# Patient Record
Sex: MALE | Race: WHITE | NOT HISPANIC OR LATINO | Employment: UNEMPLOYED | URBAN - METROPOLITAN AREA
[De-identification: names, ages, dates, MRNs, and addresses within clinical notes are randomized per-mention and may not be internally consistent; named-entity substitution may affect disease eponyms.]

---

## 2021-01-20 ENCOUNTER — TRANSFERRED RECORDS (OUTPATIENT)
Dept: HEALTH INFORMATION MANAGEMENT | Facility: CLINIC | Age: 26
End: 2021-01-20

## 2023-08-30 ENCOUNTER — OFFICE VISIT (OUTPATIENT)
Dept: FAMILY MEDICINE | Facility: CLINIC | Age: 28
End: 2023-08-30
Payer: MEDICAID

## 2023-08-30 VITALS
WEIGHT: 168 LBS | TEMPERATURE: 98.1 F | SYSTOLIC BLOOD PRESSURE: 138 MMHG | DIASTOLIC BLOOD PRESSURE: 84 MMHG | RESPIRATION RATE: 18 BRPM | HEART RATE: 94 BPM | HEIGHT: 67 IN | OXYGEN SATURATION: 94 % | BODY MASS INDEX: 26.37 KG/M2

## 2023-08-30 DIAGNOSIS — E10.9 TYPE 1 DIABETES MELLITUS WITHOUT COMPLICATION (H): Primary | ICD-10-CM

## 2023-08-30 DIAGNOSIS — Z00.00 ANNUAL PHYSICAL EXAM: ICD-10-CM

## 2023-08-30 PROBLEM — F90.9 ATTENTION DEFICIT HYPERACTIVITY DISORDER (ADHD): Status: ACTIVE | Noted: 2018-01-24

## 2023-08-30 PROBLEM — N17.9 ACUTE KIDNEY INJURY (H): Status: ACTIVE | Noted: 2020-01-01

## 2023-08-30 PROCEDURE — 99207 PR FOOT EXAM NO CHARGE: CPT | Mod: 25 | Performed by: PHYSICIAN ASSISTANT

## 2023-08-30 PROCEDURE — 99385 PREV VISIT NEW AGE 18-39: CPT | Performed by: PHYSICIAN ASSISTANT

## 2023-08-30 RX ORDER — ALBUTEROL SULFATE 90 UG/1
2 AEROSOL, METERED RESPIRATORY (INHALATION) EVERY 6 HOURS PRN
Qty: 18 G | Refills: 0 | Status: SHIPPED | OUTPATIENT
Start: 2023-08-30 | End: 2023-08-30

## 2023-08-30 RX ORDER — SERTRALINE HYDROCHLORIDE 100 MG/1
100 TABLET, FILM COATED ORAL DAILY
COMMUNITY
End: 2023-08-30

## 2023-08-30 RX ORDER — LISDEXAMFETAMINE DIMESYLATE 10 MG/1
10 CAPSULE ORAL PRN
COMMUNITY
Start: 2023-08-03

## 2023-08-30 RX ORDER — PEN NEEDLE, DIABETIC 32GX 5/32"
NEEDLE, DISPOSABLE MISCELLANEOUS
COMMUNITY
Start: 2023-04-23 | End: 2023-10-10

## 2023-08-30 RX ORDER — PROCHLORPERAZINE 25 MG/1
SUPPOSITORY RECTAL
Qty: 3 EACH | Refills: 5 | Status: SHIPPED | OUTPATIENT
Start: 2023-08-30 | End: 2023-11-22

## 2023-08-30 RX ORDER — INSULIN ASPART 100 [IU]/ML
INJECTION, SOLUTION INTRAVENOUS; SUBCUTANEOUS
COMMUNITY
End: 2023-11-22

## 2023-08-30 RX ORDER — INSULIN DEGLUDEC 200 U/ML
40 INJECTION, SOLUTION SUBCUTANEOUS EVERY MORNING
COMMUNITY
End: 2024-01-30

## 2023-08-30 RX ORDER — INSULIN PMP CART,AUT,G6/7,CNTR
EACH SUBCUTANEOUS
COMMUNITY
Start: 2023-05-02 | End: 2023-08-30

## 2023-08-30 RX ORDER — PROCHLORPERAZINE 25 MG/1
SUPPOSITORY RECTAL
Qty: 3 EACH | Refills: 5 | Status: SHIPPED | OUTPATIENT
Start: 2023-08-30 | End: 2023-08-30

## 2023-08-30 RX ORDER — ERGOCALCIFEROL 1.25 MG/1
50000 CAPSULE, LIQUID FILLED ORAL WEEKLY
COMMUNITY
Start: 2023-04-28 | End: 2023-08-30

## 2023-08-30 ASSESSMENT — ENCOUNTER SYMPTOMS
PARESTHESIAS: 0
FREQUENCY: 0
HEADACHES: 0
SORE THROAT: 0
FEVER: 0
ARTHRALGIAS: 0
NERVOUS/ANXIOUS: 0
HEMATOCHEZIA: 0
SHORTNESS OF BREATH: 0
DIZZINESS: 0
ABDOMINAL PAIN: 0
DIARRHEA: 0
CHILLS: 0
HEARTBURN: 0
NAUSEA: 0
JOINT SWELLING: 0
PALPITATIONS: 0
CONSTIPATION: 0
DYSURIA: 0
WEAKNESS: 0
COUGH: 0
HEMATURIA: 0
MYALGIAS: 0
EYE PAIN: 0

## 2023-08-30 NOTE — PROGRESS NOTES
SUBJECTIVE:   CC: Aashish is an 28 year old who presents for preventative health visit.       28-year-old here to establish care  He recently relocated from [place]  He is a type I diabetic and has been since age 10  He is on a Dexcom continuous glucose monitor  He states his last 3 A1c's were in the sixes to low sevens  He has an appointment with endocrinology but not until December  He has no complaints or concerns does need some medications he states his labs are up-to-date    Healthy Habits:     Getting at least 3 servings of Calcium per day:  Yes    Bi-annual eye exam:  Yes    Dental care twice a year:  Yes    Sleep apnea or symptoms of sleep apnea:  None    Diet:  Regular (no restrictions)    Frequency of exercise:  2-3 days/week    Duration of exercise:  30-45 minutes    Taking medications regularly:  Yes    Medication side effects:  None    Additional concerns today:  No      Today's PHQ-2 Score:       8/30/2023     2:42 PM   PHQ-2 ( 1999 [employer])   Q1: Little interest or pleasure in doing things 0   Q2: Feeling down, depressed or hopeless 0   PHQ-2 Score 0   Q1: Little interest or pleasure in doing things Not at all   Q2: Feeling down, depressed or hopeless Not at all   PHQ-2 Score 0                   Have you ever done Advance Care Planning? (For example, a Health Directive, POLST, or a discussion with a medical provider or your loved ones about your wishes): No, advance care planning information given to patient to review.  Patient declined advance care planning discussion at this time.    Social History     Tobacco Use    Smoking status: Former     Types: Cigarettes    Smokeless tobacco: Never   Substance Use Topics    Alcohol use: Not on file             8/30/2023     2:42 PM   Alcohol Use   Prescreen: >3 drinks/day or >7 drinks/week? No       Last PSA: No results found for: PSA    Reviewed orders with patient. Reviewed health maintenance and updated orders accordingly - Yes  Lab work is in  "process    Reviewed and updated as needed this visit by clinical staff   Tobacco  Allergies  Meds              Reviewed and updated as needed this visit by Provider                     Review of Systems   Constitutional:  Negative for chills and fever.   HENT:  Negative for congestion, ear pain, hearing loss and sore throat.    Eyes:  Negative for pain and visual disturbance.   Respiratory:  Negative for cough and shortness of breath.    Cardiovascular:  Negative for chest pain and palpitations.   Gastrointestinal:  Negative for abdominal pain, constipation, diarrhea and nausea.   Genitourinary:  Negative for dysuria, frequency, genital sores, hematuria and urgency.   Musculoskeletal:  Negative for arthralgias, joint swelling and myalgias.   Skin:  Negative for rash.   Neurological:  Negative for dizziness, weakness and headaches.   Psychiatric/Behavioral:  The patient is not nervous/anxious.          OBJECTIVE:   /84   Pulse 94   Temp 98.1  F (36.7  C)   Resp 18   Ht 1.708 m (5' 7.25\")   Wt 76.2 kg (168 lb)   SpO2 94%   BMI 26.12 kg/m      Physical Exam  Vitals reviewed.   Constitutional:       General: Aashish Vanegas is not in acute distress.     Appearance: Normal appearance. Aashish Vanegas is well-developed. Aashish Vanegas is not ill-appearing.   HENT:      Head: Normocephalic and atraumatic.      Right Ear: Tympanic membrane and external ear normal.      Left Ear: Tympanic membrane and external ear normal.      Nose: Nose normal. No rhinorrhea.      Mouth/Throat:      Mouth: Mucous membranes are moist.      Pharynx: No oropharyngeal exudate.   Eyes:      Extraocular Movements: Extraocular movements intact.      Conjunctiva/sclera: Conjunctivae normal.   Neck:      Thyroid: No thyromegaly.      Trachea: No tracheal deviation.   Cardiovascular:      Rate and Rhythm: Normal rate and regular rhythm.      Pulses: Normal pulses.      Heart sounds: Normal heart sounds, S1 normal and " S2 normal. No murmur heard.     No S3 or S4 sounds.   Pulmonary:      Effort: Pulmonary effort is normal. No respiratory distress.      Breath sounds: Normal breath sounds. No wheezing or rales.   Chest:   Breasts:     Right: No inverted nipple, mass, nipple discharge or skin change.      Left: No inverted nipple, mass, nipple discharge or skin change.   Genitourinary:     Labia:         Right: No rash.         Left: No rash.       Vagina: Normal.      Cervix: Normal.   Musculoskeletal:         General: Normal range of motion.      Cervical back: Normal range of motion and neck supple.   Lymphadenopathy:      Cervical: No cervical adenopathy.      Upper Body:      Right upper body: No supraclavicular or axillary adenopathy.      Left upper body: No supraclavicular or axillary adenopathy.   Skin:     General: Skin is warm and dry.      Findings: No rash.   Neurological:      General: No focal deficit present.      Mental Status: Aashish Vanegas is alert.      Motor: No abnormal muscle tone.      Deep Tendon Reflexes: Reflexes are normal and symmetric.      Comments: Monofilament 5/5 bilateral feet   Psychiatric:         Behavior: Behavior normal.               ASSESSMENT/PLAN:   (E10.9) Type 1 diabetes mellitus without complication (H)  (primary encounter diagnosis)  Comment: Endocrinology as scheduled he may be seen here in 6 months and prior as neededPlan: Continuous Glucose Monitor Sup KIT, Continuous         Blood Gluc Sensor (DEXCOM G6 SENSOR) MISC, FOOT        EXAM            (Z00.00) Annual physical exam  Comment: Return in 1 year  Plan:           COUNSELING:   Reviewed preventive health counseling, as reflected in patient instructions       Regular exercise       Healthy diet/nutrition        Aashish Vanegas reports that Aashish Vanegas has quit smoking. Aashish Vanegas's smoking use included cigarettes. Aashish Vanegas has never used smokeless tobacco.            LILY Bess  [facility]

## 2023-09-02 ENCOUNTER — MYC MEDICAL ADVICE (OUTPATIENT)
Dept: FAMILY MEDICINE | Facility: CLINIC | Age: 28
End: 2023-09-02
Payer: COMMERCIAL

## 2023-09-02 DIAGNOSIS — E10.9 TYPE 1 DIABETES MELLITUS WITHOUT COMPLICATION (H): Primary | ICD-10-CM

## 2023-09-05 RX ORDER — PROCHLORPERAZINE 25 MG/1
SUPPOSITORY RECTAL
Qty: 3 EACH | Refills: 5 | OUTPATIENT
Start: 2023-09-05

## 2023-09-05 NOTE — TELEPHONE ENCOUNTER
Last Written Prescription Date:  8/30/23  Last Fill Quantity: 3,  # refills: 5   Last office visit provider:  8/30/23     MARCELLE BAILEY RN 09/05/23 8:47 AM

## 2023-09-11 NOTE — TELEPHONE ENCOUNTER
"Routing to provider to approve refills if appropriate as medications are currently \"patient reported\".          "

## 2023-09-21 ENCOUNTER — TELEPHONE (OUTPATIENT)
Dept: ENDOCRINOLOGY | Facility: CLINIC | Age: 28
End: 2023-09-21
Payer: COMMERCIAL

## 2023-09-21 NOTE — TELEPHONE ENCOUNTER
RECORDS RECEIVED FROM: internal/epic   DATE RECEIVED: records requested from [facility]  9/21/23   NOTES (FOR ALL VISITS) STATUS DETAILS   OFFICE NOTES from referring provider Internal Self    OFFICE NOTES from other specialist Internal 8/30/23 Nixon Barrios PA - type 1 diabetes without complication   MEDICATION LIST Internal    IMAGING      LABS     DIABETES: HBGA1C, CREATININE, FASTING LIPIDS, MICROALBUMIN URINE, POTASSIUM, TSH, T4    THYROID: TSH, T4, CBC, THYRODLONULIN, TOTAL T3, FREE T4, CALCITONIN, CEA [facility]    CBC: 11/28/22  HBGA1C:7/27/23        Records Requested     September 21, 2023 10:27 AM   [id number]   Facility  [facility] Phone: [phone] Fax: [phone]   Outcome Requested

## 2023-09-25 NOTE — TELEPHONE ENCOUNTER
Per caller brooklyn irvin there were no clinical notes attached to this fax sent they need those added  from the  last 12 months faxed to them asap same fax number.

## 2023-09-27 ENCOUNTER — TELEPHONE (OUTPATIENT)
Dept: ENDOCRINOLOGY | Facility: CLINIC | Age: 28
End: 2023-09-27
Payer: COMMERCIAL

## 2023-09-28 NOTE — TELEPHONE ENCOUNTER
Juanita has been informed that patient has not yet been established with a provider and his scheduled appt as a new patient is not until 12/2023.  Epic appt notes also state that patient needs to get established with a PCP.

## 2023-09-29 ENCOUNTER — TELEPHONE (OUTPATIENT)
Dept: ENDOCRINOLOGY | Facility: CLINIC | Age: 28
End: 2023-09-29
Payer: COMMERCIAL

## 2023-09-29 NOTE — TELEPHONE ENCOUNTER
Patient call:     Appointment type: new diabetes   Provider: any PA any location   Return date:sooner than Dec. 2023   Speciality phone number: [phone]  Additional appointment(s) needed:   Additional notes: LVM and sent myc x1 to schedule sooner appt with PA any location   OK TO USE [facility]     Please note that the above appointment(s) will require manual scheduling as they are marked as [facility] and will not appear using auto search. Do not schedule the patient if another patient has already been scheduled in the requested appointment slot.           Carrie Espinoza on 9/29/2023 at 1:21 PM

## 2023-10-09 ENCOUNTER — MYC MEDICAL ADVICE (OUTPATIENT)
Dept: FAMILY MEDICINE | Facility: CLINIC | Age: 28
End: 2023-10-09
Payer: COMMERCIAL

## 2023-10-09 DIAGNOSIS — E10.9 TYPE 1 DIABETES MELLITUS WITHOUT COMPLICATION (H): Primary | ICD-10-CM

## 2023-10-10 RX ORDER — PEN NEEDLE, DIABETIC 32GX 5/32"
NEEDLE, DISPOSABLE MISCELLANEOUS 3 TIMES DAILY
Qty: 100 EACH | Refills: 1 | Status: SHIPPED | OUTPATIENT
Start: 2023-10-10 | End: 2023-11-22

## 2023-10-22 ENCOUNTER — HEALTH MAINTENANCE LETTER (OUTPATIENT)
Age: 28
End: 2023-10-22

## 2023-11-15 NOTE — CONFIDENTIAL NOTE
RECORDS RECEIVED FROM: internal/epic   DATE RECEIVED: records requested from [facility]  9/21/23   NOTES (FOR ALL VISITS) STATUS DETAILS   OFFICE NOTES from referring provider Internal Self    OFFICE NOTES from other specialist Internal 8/30/23 [facility] Nixon Hua PA - type 1 diabetes without complication    [facility] - 9.25.23    MEDICATION LIST Internal     IMAGING        LABS       DIABETES: HBGA1C, CREATININE, FASTING LIPIDS, MICROALBUMIN URINE, POTASSIUM, TSH, T4     THYROID: TSH, T4, CBC, THYRODLONULIN, TOTAL T3, FREE T4, CALCITONIN, CEA [facility]     CBC: 11/28/22  HBGA1C:7/27/23  Cmp- 7/27/23  Creatinine- 11.28.22  TSH- 11.21.22

## 2023-11-20 NOTE — PROGRESS NOTES
Virtual Visit Details    Type of service:  Video Visit   Video Start Time:   Video End Time:    Originating Location (pt. Location):     Distant Location (provider location):    Platform used for Video Visit:         Outcome for 11/20/23 10:02 AM: Telecom Italiahart message sent  Gayle Trent CMA  Outcome for 11/21/23 1:16 PM: Left Voicemail   Blaire Gale MA  Outcome for 11/22/23 9:02 AM: Left Voicemail   Patricia Quiñones LPN   Outcome for 11/22/23 9:12 AM: Patient needs to connect dexcom account and upload device.   Patricia Quiñones LPN

## 2023-11-21 ENCOUNTER — TELEPHONE (OUTPATIENT)
Dept: ENDOCRINOLOGY | Facility: CLINIC | Age: 28
End: 2023-11-21
Payer: COMMERCIAL

## 2023-11-21 NOTE — TELEPHONE ENCOUNTER
Called patient and left voicemail. Patient has appointment on  11/22/23 . Need to collect 14 days of blood sugar readings if patient is using meter, or if patient is using CGM, need to get patients device uploaded to retrieve report for provider to review.  Blaire Gale MA

## 2023-11-22 ENCOUNTER — VIRTUAL VISIT (OUTPATIENT)
Dept: ENDOCRINOLOGY | Facility: CLINIC | Age: 28
End: 2023-11-22
Payer: COMMERCIAL

## 2023-11-22 VITALS — HEIGHT: 68 IN | WEIGHT: 160 LBS | BODY MASS INDEX: 24.25 KG/M2

## 2023-11-22 DIAGNOSIS — E10.9 TYPE 1 DIABETES MELLITUS WITHOUT COMPLICATION (H): ICD-10-CM

## 2023-11-22 DIAGNOSIS — E10.65 TYPE 1 DIABETES MELLITUS WITH HYPERGLYCEMIA (H): Primary | ICD-10-CM

## 2023-11-22 PROCEDURE — 99204 OFFICE O/P NEW MOD 45 MIN: CPT | Mod: GT | Performed by: PHYSICIAN ASSISTANT

## 2023-11-22 RX ORDER — PROCHLORPERAZINE 25 MG/1
SUPPOSITORY RECTAL
Qty: 1 EACH | Refills: 11 | Status: SHIPPED | OUTPATIENT
Start: 2023-11-22 | End: 2023-12-07

## 2023-11-22 RX ORDER — PEN NEEDLE, DIABETIC 32GX 5/32"
NEEDLE, DISPOSABLE MISCELLANEOUS 3 TIMES DAILY
Qty: 250 EACH | Refills: 3 | Status: SHIPPED | OUTPATIENT
Start: 2023-11-22 | End: 2024-01-07

## 2023-11-22 RX ORDER — PROCHLORPERAZINE 25 MG/1
SUPPOSITORY RECTAL
Qty: 6 EACH | Refills: 11 | Status: SHIPPED | OUTPATIENT
Start: 2023-11-22 | End: 2023-12-07

## 2023-11-22 ASSESSMENT — PAIN SCALES - GENERAL: PAINLEVEL: NO PAIN (0)

## 2023-11-22 NOTE — LETTER
11/22/2023       RE: Aashish Vanegas  [address] [facility] [address]     Dear Colleague,    Thank you for referring your patient, Aashish Vanegas, to the [facility] [place] [facility]. Please see a copy of my visit note below.    Virtual Visit Details    Type of service:  Video Visit   Video Start Time:   Video End Time:    Originating Location (pt. Location):     Distant Location (provider location):    Platform used for Video Visit:         Outcome for 11/20/23 10:02 AM: Magin message sent  Gayle Trent CMA  Outcome for 11/21/23 1:16 PM: Left Voicemail   Blaire Gale MA  Outcome for 11/22/23 9:02 AM: Left Voicemail   Patricia Quiñones LPN   Outcome for 11/22/23 9:12 AM: Patient needs to connect dexcom account and upload device.   Patricia Quiñones LPN         Time of start: 10:31 am   Time of end: 11:01 am   Total duration of video visit:30 minutes.  Providers location: offsite.  Patients location: [place].    HPI  Aashish Vanegas is a 28 year old male with type 1 diabetes mellitus being seen today as a new patient for diabetes evaluation and management.  Pt moved from [place] to [place] in early Aug 2023.  He is currently living in [place], [place] and is at a coffee shop in [place], [place] now during our video visit.  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  He was dx having type 1 DM at age 10.  His diabetes is complicated by diabetic retinopathy. No hx of nephropathy or neuropathy.  Pt has hx of ADHD, depression, admissions for DKA- last episode was Fall of 2022 stating he was no longer on his parents health insurance and was without insulin for a few days which caused DKA. He also has hx of low Vit B12 and low Vit D. He states he has never been tested for celiac.  For his diabetes, he is currently using Tresiba 200 unit/mL pen and takes 40 units subcutaneous each am. He also takes  Novolog 1 unit/4 gms CHO with meals with correction 1 unit/40 for BG > 140.  Most recent A1C was 7.3 % on 7/27/2023.  Previous A1C was 6.7 % in 3/2023.  Pt's A1C was 6.7 % in Nov 2022, 7.6 % in 9/2021, 8.1 % in 3/2021 and 9.5 % in Oct 2020.  Aashish's C-peptide was < 0.10 ng/mL in 3/2021 and MERRICK Ab + 3/2021.  Pt was unable to upload his DexcomG6 sensor data before his appointment today. He plans to get his DexcomG6 sensor data to me later today.  On ROS today, no complaints.  Weight stable per patient.  He states he eats healthy. Maybe 1 regular soda 3 times per month.  He is active.  Pt denies frequent headaches, hx of CVA, blurred vision, n/v, hx of thyroid disease, SOB at rest, cough or fever.  No chest pain or cardiac hx.  Denies abd pain, diarrhea, dysuria or hematuria.  Aashish denies neuropathy sx or foot ulcers.  No hx of HTN.    Diabetes Care  Retinopathy: yes- seen in Aug 2022 with PDR left eye and NPDR right eye.  Nephropathy:no. Urine microalbuminuria negative in Nov 2022.  Neuropathy: no.  Foot Exam: no exam today.  Taking aspirin: no.  Lipids:  in Dec 2022.  Insulin: Basal and meal time insulin with correction. Pt is interested in the OmniPod5 insulin pump- referred to CDE.  Testing: DexcomG6 sensor.    ROS  See under HPI.    Allergies  Allergies   Allergen Reactions    Penicillins Rash    Pollen Extract Other (See Comments)     Rhinorrhea, itchy eyes, sneezing.       Medications  Current Outpatient Medications   Medication Sig Dispense Refill    BD PEN NEEDLE ARCHIE 2ND GEN 32G X 4 MM miscellaneous Inject Subcutaneous 3 times daily Use 3 pen needles daily. 250 each 3    Continuous Blood Gluc Sensor (DEXCOM G6 SENSOR) MISC Change every 10 days. 6 each 11    Continuous Blood Gluc Transmit (DEXCOM G6 TRANSMITTER) MISC Change every 3 months. 1 each 11    Continuous Glucose Monitor Sup KIT 1 Act continuous 1 kit 3    Glucagon (BAQSIMI) 3 MG/DOSE nasal powder Spray 1 spray (3 mg) in nostril as needed in  the event of unconscious hypoglycemia or hypoglycemic seizure. May repeat dose if no response after 15 minutes. 1 each 1    insulin aspart (NOVOLOG PEN) 100 UNIT/ML pen Inject 1 unit/4 gms CHO with meals,plus correction (1unit/40 for BG > 140 ). Pt uses 25 units daily. 15 mL 3    lisdexamfetamine (VYVANSE) 10 MG capsule Take 10 mg by mouth every morning      TRESIBA FLEXTOUCH 200 UNIT/ML pen Inject 40 Units Subcutaneous every morning         Family History    Denies family hx of diabetes.      Social History    Smoke: none at this time.   ETOH: social. One to two drinks per week per patient.  Marital status: single.  Children: none.  Occupation: retail work.    Past Medical History  Type 1 DM dx age 10  Diabetic retinopathy  Low vit D  Low Vit B12  ADHD  Depression  Seasonal allergies.    No surgeries.      Physical Exam    No exam today.    RESULTS    A1C 7.3 % on 7/27/2023    TSH normal in July 2023    Creat 0.8 / GFR > 60 mL/min in 7/2023    C-peptide < 0.10 ng/mL and FARHANA Ab + in 3/2021.      ASSESSMENT/PLAN:      TYPE 1 DIABETES MELLITUS: 28 year old male with type 1 DM dx at age 10 complicated by diabetic retinopathy. No hx of nephropathy or neuropathy. Past hx of admissions for DKA. Last DKA episode was in Fall 2022 due to lack of insulin/medical insurance.  I have no DexcomG6 sensor data to review at his time. He states he will upload his sensor data for me to review later today. For now will continue current insulin doses. He is interested in the OmniPod5 insulin pump. Referred to CDE/RD to discuss insulin pumps. His urine microalbuminuria has been negative with normal creat/GFR and no sx of diabetic neuropathy or foot ulcers.  No vitals today.  RETINOPATHY: Reminded him to see Oph annually. Denies blurred vision.  MENTAL HEALTH: Stable at this time per patient.  FOLLOW UP: With me in 3 months. Referred to CDE/RD to discuss use of insulin pump. Insulins, pen needles, DexcomG6 sensors and transmitters refilled  today. Baqsimi RX sent to pharmacy today. A1C  and annual fasting diabetes labs ordered today including a Vit D , Vit B12 level and celiac lab.    Time spent reviewing chart and labs today = 10 minutes.  Time for video visit today = 30 minutes.  Time for documentation today = 15 minutes.    Total time for visit today = 55 minutes.    Sommer Dsouza PA-C

## 2023-11-22 NOTE — TELEPHONE ENCOUNTER
Called patient and left voicemail. Patient has an appointment on 11/22/2023. Need patient to upload their Dexcom device to site for provider to review prior to their appointment.    Patricia Quiñones LPN 11/22/23 9:02 AM

## 2023-11-22 NOTE — PROGRESS NOTES
Time of start: 10:31 am   Time of end: 11:01 am   Total duration of video visit:30 minutes.  Providers location: offsite.  Patients location: [place].    [facility] [place]  Aashish Vanegas is a 28 year old male with type 1 diabetes mellitus being seen today as a new patient for diabetes evaluation and management.  Pt moved from [place] to [place] in early Aug 2023.  He is currently living in [place], [place] and is at a coffee shop in [place], [place] now during our video visit.  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  He was dx having type 1 DM at age 10.  His diabetes is complicated by diabetic retinopathy. No hx of nephropathy or neuropathy.  Pt has hx of ADHD, depression, admissions for DKA- last episode was Fall of 2022 stating he was no longer on his parents health insurance and was without insulin for a few days which caused DKA. He also has hx of low Vit B12 and low Vit D. He states he has never been tested for celiac.  For his diabetes, he is currently using Tresiba 200 unit/mL pen and takes 40 units subcutaneous each am. He also takes Novolog 1 unit/4 gms CHO with meals with correction 1 unit/40 for BG > 140.  Most recent A1C was 7.3 % on 7/27/2023.  Previous A1C was 6.7 % in 3/2023.  Pt's A1C was 6.7 % in Nov 2022, 7.6 % in 9/2021, 8.1 % in 3/2021 and 9.5 % in Oct 2020.  Aashish's C-peptide was < 0.10 ng/mL in 3/2021 and MERRICK Ab + 3/2021.  Pt was unable to upload his DexcomG6 sensor data before his appointment today. He plans to get his DexcomG6 sensor data to me later today.  On ROS today, no complaints.  Weight stable per patient.  He states he eats healthy. Maybe 1 regular soda 3 times per month.  He is active.  Pt denies frequent headaches, hx of CVA, blurred vision, n/v, hx of thyroid disease, SOB at rest, cough or fever.  No chest pain or cardiac hx.  Denies abd pain, diarrhea, dysuria or hematuria.  Aashish denies neuropathy sx or foot ulcers.  No hx of HTN.    Diabetes Care  Retinopathy:  yes- seen in Aug 2022 with PDR left eye and NPDR right eye.  Nephropathy:no. Urine microalbuminuria negative in Nov 2022.  Neuropathy: no.  Foot Exam: no exam today.  Taking aspirin: no.  Lipids:  in Dec 2022.  Insulin: Basal and meal time insulin with correction. Pt is interested in the OmniPod5 insulin pump- referred to CDE.  Testing: DexcomG6 sensor.    ROS  See under HPI.    Allergies  Allergies   Allergen Reactions    Penicillins Rash    Pollen Extract Other (See Comments)     Rhinorrhea, itchy eyes, sneezing.       Medications  Current Outpatient Medications   Medication Sig Dispense Refill    BD PEN NEEDLE ARCHIE 2ND GEN 32G X 4 MM miscellaneous Inject Subcutaneous 3 times daily Use 3 pen needles daily. 250 each 3    Continuous Blood Gluc Sensor (DEXCOM G6 SENSOR) MISC Change every 10 days. 6 each 11    Continuous Blood Gluc Transmit (DEXCOM G6 TRANSMITTER) MISC Change every 3 months. 1 each 11    Continuous Glucose Monitor Sup KIT 1 Act continuous 1 kit 3    Glucagon (BAQSIMI) 3 MG/DOSE nasal powder Spray 1 spray (3 mg) in nostril as needed in the event of unconscious hypoglycemia or hypoglycemic seizure. May repeat dose if no response after 15 minutes. 1 each 1    insulin aspart (NOVOLOG PEN) 100 UNIT/ML pen Inject 1 unit/4 gms CHO with meals,plus correction (1unit/40 for BG > 140 ). Pt uses 25 units daily. 15 mL 3    lisdexamfetamine (VYVANSE) 10 MG capsule Take 10 mg by mouth every morning      TRESIBA FLEXTOUCH 200 UNIT/ML pen Inject 40 Units Subcutaneous every morning         Family History    Denies family hx of diabetes.      Social History    Smoke: none at this time.   ETOH: social. One to two drinks per week per patient.  Marital status: single.  Children: none.  Occupation: retail work.    Past Medical History  Type 1 DM dx age 10  Diabetic retinopathy  Low vit D  Low Vit B12  ADHD  Depression  Seasonal allergies.    No surgeries.      Physical Exam    No exam today.    RESULTS    A1C 7.3 % on  7/27/2023    TSH normal in July 2023    Creat 0.8 / GFR > 60 mL/min in 7/2023    C-peptide < 0.10 ng/mL and FARHANA Ab + in 3/2021.      ASSESSMENT/PLAN:      TYPE 1 DIABETES MELLITUS: 28 year old male with type 1 DM dx at age 10 complicated by diabetic retinopathy. No hx of nephropathy or neuropathy. Past hx of admissions for DKA. Last DKA episode was in Fall 2022 due to lack of insulin/medical insurance.  I have no DexcomG6 sensor data to review at his time. He states he will upload his sensor data for me to review later today. For now will continue current insulin doses. He is interested in the OmniPod5 insulin pump. Referred to CDE/RD to discuss insulin pumps. His urine microalbuminuria has been negative with normal creat/GFR and no sx of diabetic neuropathy or foot ulcers.  No vitals today.  RETINOPATHY: Reminded him to see Oph annually. Denies blurred vision.  MENTAL HEALTH: Stable at this time per patient.  FOLLOW UP: With me in 3 months. Referred to CDE/RD to discuss use of insulin pump. Insulins, pen needles, DexcomG6 sensors and transmitters refilled today. Baqsimi RX sent to pharmacy today. A1C  and annual fasting diabetes labs ordered today including a Vit D , Vit B12 level and celiac lab.    Time spent reviewing chart and labs today = 10 minutes.  Time for video visit today = 30 minutes.  Time for documentation today = 15 minutes.    Total time for visit today = 55 minutes.    Sommer Dsouza PA-C

## 2023-11-22 NOTE — NURSING NOTE
Is the patient currently in the state of [place]? YES    Visit mode:VIDEO    If the visit is dropped, the patient can be reconnected by: VIDEO VISIT: Text to cell phone:   Telephone Information:   Mobile [phone]       Will anyone else be joining the visit? NO  (If patient encounters technical issues they should call [phone])    How would you like to obtain your AVS? MyChart    Are changes needed to the allergy or medication list? No    Reason for visit: Consult    No other vitals to report per pt    Danette ROJASF

## 2023-11-28 ENCOUNTER — LAB (OUTPATIENT)
Dept: LAB | Facility: CLINIC | Age: 28
End: 2023-11-28
Payer: COMMERCIAL

## 2023-11-28 ENCOUNTER — TELEPHONE (OUTPATIENT)
Dept: ENDOCRINOLOGY | Facility: CLINIC | Age: 28
End: 2023-11-28

## 2023-11-28 DIAGNOSIS — E10.65 TYPE 1 DIABETES MELLITUS WITH HYPERGLYCEMIA (H): ICD-10-CM

## 2023-11-28 LAB — HBA1C MFR BLD: 8.9 % (ref 0–5.6)

## 2023-11-28 PROCEDURE — 80061 LIPID PANEL: CPT

## 2023-11-28 PROCEDURE — 84443 ASSAY THYROID STIM HORMONE: CPT

## 2023-11-28 PROCEDURE — 83036 HEMOGLOBIN GLYCOSYLATED A1C: CPT

## 2023-11-28 PROCEDURE — 86364 TISS TRNSGLTMNASE EA IG CLAS: CPT

## 2023-11-28 PROCEDURE — 36415 COLL VENOUS BLD VENIPUNCTURE: CPT

## 2023-11-28 PROCEDURE — 82607 VITAMIN B-12: CPT

## 2023-11-28 PROCEDURE — 82565 ASSAY OF CREATININE: CPT

## 2023-11-28 PROCEDURE — 82306 VITAMIN D 25 HYDROXY: CPT

## 2023-11-28 PROCEDURE — 84450 TRANSFERASE (AST) (SGOT): CPT

## 2023-11-28 NOTE — TELEPHONE ENCOUNTER
Patient call:     Appointment type: return diabetes   Provider: Meet   Return date: around Feb 2024  Speciality phone number: [phone]  Additional appointment(s) needed: CDE referral: [phone] and lab order   Additional notes: LVM and sent johnathan Espinoza on 11/28/2023 at 3:50 PM

## 2023-11-29 LAB
AST SERPL W P-5'-P-CCNC: 20 U/L (ref 0–45)
CHOLEST SERPL-MCNC: 250 MG/DL
CREAT SERPL-MCNC: 0.67 MG/DL (ref 0.51–1.17)
EGFRCR SERPLBLD CKD-EPI 2021: >90 ML/MIN/1.73M2
HDLC SERPL-MCNC: 59 MG/DL
LDLC SERPL CALC-MCNC: 175 MG/DL
NONHDLC SERPL-MCNC: 191 MG/DL
TRIGL SERPL-MCNC: 79 MG/DL
TSH SERPL DL<=0.005 MIU/L-ACNC: 1.19 UIU/ML (ref 0.3–4.2)
TTG IGA SER-ACNC: 0.5 U/ML
TTG IGG SER-ACNC: 0.9 U/ML
VIT B12 SERPL-MCNC: 511 PG/ML (ref 232–1245)

## 2023-11-30 PROCEDURE — 82570 ASSAY OF URINE CREATININE: CPT

## 2023-11-30 PROCEDURE — 82043 UR ALBUMIN QUANTITATIVE: CPT

## 2023-12-01 ENCOUNTER — APPOINTMENT (OUTPATIENT)
Dept: LAB | Facility: CLINIC | Age: 28
End: 2023-12-01
Payer: COMMERCIAL

## 2023-12-01 LAB
CREAT UR-MCNC: 45.4 MG/DL
MICROALBUMIN UR-MCNC: <12 MG/L
MICROALBUMIN/CREAT UR: NORMAL MG/G{CREAT}

## 2023-12-04 ENCOUNTER — MYC MEDICAL ADVICE (OUTPATIENT)
Dept: ENDOCRINOLOGY | Facility: CLINIC | Age: 28
End: 2023-12-04
Payer: COMMERCIAL

## 2023-12-04 LAB
DEPRECATED CALCIDIOL+CALCIFEROL SERPL-MC: 24 UG/L (ref 20–75)
VITAMIN D2 SERPL-MCNC: 12 UG/L
VITAMIN D3 SERPL-MCNC: 12 UG/L

## 2023-12-04 NOTE — TELEPHONE ENCOUNTER
Left Voicemail (2nd Attempt) and Sent Mychart (2nd Attempt) for the patient to call back and schedule the following:    Appointment type: Return diabetes  Provider: Alia Dsouza  Return date: 3 months (February 2024)  Specialty phone number: [phone]  Additional appointment(s) needed: NA  Additonal Notes: NA

## 2023-12-05 ENCOUNTER — TELEPHONE (OUTPATIENT)
Dept: ENDOCRINOLOGY | Facility: CLINIC | Age: 28
End: 2023-12-05
Payer: COMMERCIAL

## 2023-12-05 NOTE — TELEPHONE ENCOUNTER
Patient call:     Appointment type: Return diabetes   Provider: Meet   Return date:on or near Feb 2024   Speciality phone number: [phone]  Additional appointment(s) needed: N/A   Additional notes: LVM, MyC x1   MyC Med Advance message pt stated phone not working will give pt times to schedule.    Kimberley Benavides on 12/5/2023 at 12:39 PM

## 2023-12-07 ENCOUNTER — TELEPHONE (OUTPATIENT)
Dept: ENDOCRINOLOGY | Facility: CLINIC | Age: 28
End: 2023-12-07
Payer: COMMERCIAL

## 2023-12-07 ENCOUNTER — MYC MEDICAL ADVICE (OUTPATIENT)
Dept: ENDOCRINOLOGY | Facility: CLINIC | Age: 28
End: 2023-12-07
Payer: COMMERCIAL

## 2023-12-07 DIAGNOSIS — E10.65 TYPE 1 DIABETES MELLITUS WITH HYPERGLYCEMIA (H): ICD-10-CM

## 2023-12-07 DIAGNOSIS — E10.9 TYPE 1 DIABETES MELLITUS WITHOUT COMPLICATION (H): ICD-10-CM

## 2023-12-07 RX ORDER — PROCHLORPERAZINE 25 MG/1
SUPPOSITORY RECTAL
Qty: 6 EACH | Refills: 4 | Status: SHIPPED | OUTPATIENT
Start: 2023-12-07

## 2023-12-07 RX ORDER — PROCHLORPERAZINE 25 MG/1
SUPPOSITORY RECTAL
Qty: 1 EACH | Refills: 4 | Status: SHIPPED | OUTPATIENT
Start: 2023-12-07

## 2023-12-07 NOTE — TELEPHONE ENCOUNTER
Patient has [place] Medicaid plan and Dexcom must be billed medically through that plan and [facility] is not in network to bill Medically, patient will need to choose another DME supplier to fill Dexcom, I am guessing he has a Prior authorization in place already since he has been filling them

## 2023-12-07 NOTE — TELEPHONE ENCOUNTER
PATIENT EMAILED BACK WITH [facility] AND ASKED TO BE SCHEDULED  02/29 AT 1130  SCHEDULED AND SENT MESSAGE BACK TO HIM    Anusha Lomeli on 12/7/2023 at 11:18 AM

## 2023-12-07 NOTE — TELEPHONE ENCOUNTER
Patient call:     Appointment type: RETURN DIABETES   Provider: SOMMER DSOUZA   Return date: SCHEDULE AROUND  FEBRUARY  [facility] phone number: [phone]  Additional appointment(s) needed: SEE BELOW   Additional notes:  LVM AND SENT MYC MAAME Lomeli on 12/7/2023 at 8:37 AM      OPTIONS!!!        02/29/2024      8:00  am virtual Sommer Dsouza  8:30 am virtual Sommer Dsouza  9:30 am virtual  Sommer Dsouza   10:30 am virtual Sommer Dsouza  11:30am virtual with Sommer Dsouza  1:30 pm virtual Sommer Dsouza     03/01/2024    8:30am   virtual Sommer Dsouza   9:30am  virtual Sommer Dsouza   11:00am  virtual Sommer Dsouza   11:30am  virtual Sommer Dsouza   12:00pm  virtual Sommer Dsouza   1:30pm  virtual Sommer Dsouza   2:00pm  virtual Sommer Dsouza   3:00pm  virtual Sommer Dsouza

## 2023-12-20 ENCOUNTER — PRE VISIT (OUTPATIENT)
Dept: ENDOCRINOLOGY | Facility: CLINIC | Age: 28
End: 2023-12-20

## 2023-12-28 ENCOUNTER — MYC REFILL (OUTPATIENT)
Dept: FAMILY MEDICINE | Facility: CLINIC | Age: 28
End: 2023-12-28
Payer: COMMERCIAL

## 2023-12-28 DIAGNOSIS — E10.9 TYPE 1 DIABETES MELLITUS WITHOUT COMPLICATION (H): ICD-10-CM

## 2023-12-29 NOTE — TELEPHONE ENCOUNTER
Routing refill request to provider for review/approval because:  Previously ordered by another provider    Last Written Prescription Date: 12/7/23  Ordered by outside provider   Last office visit: 8/30/2023

## 2024-01-07 ENCOUNTER — MYC REFILL (OUTPATIENT)
Dept: ENDOCRINOLOGY | Facility: CLINIC | Age: 29
End: 2024-01-07
Payer: COMMERCIAL

## 2024-01-07 DIAGNOSIS — E10.9 TYPE 1 DIABETES MELLITUS WITHOUT COMPLICATION (H): ICD-10-CM

## 2024-01-08 RX ORDER — PEN NEEDLE, DIABETIC 32GX 5/32"
NEEDLE, DISPOSABLE MISCELLANEOUS 3 TIMES DAILY
Qty: 250 EACH | Refills: 3 | Status: SHIPPED | OUTPATIENT
Start: 2024-01-08 | End: 2024-03-19

## 2024-01-13 ENCOUNTER — MYC REFILL (OUTPATIENT)
Dept: ENDOCRINOLOGY | Facility: CLINIC | Age: 29
End: 2024-01-13
Payer: COMMERCIAL

## 2024-01-13 DIAGNOSIS — E10.9 TYPE 1 DIABETES MELLITUS WITHOUT COMPLICATION (H): ICD-10-CM

## 2024-01-24 ENCOUNTER — MYC MEDICAL ADVICE (OUTPATIENT)
Dept: ENDOCRINOLOGY | Facility: CLINIC | Age: 29
End: 2024-01-24
Payer: COMMERCIAL

## 2024-01-24 DIAGNOSIS — E10.9 TYPE 1 DIABETES MELLITUS WITHOUT COMPLICATION (H): ICD-10-CM

## 2024-01-24 DIAGNOSIS — E10.9 TYPE 1 DIABETES MELLITUS WITHOUT COMPLICATION (H): Primary | ICD-10-CM

## 2024-01-30 ENCOUNTER — MYC MEDICAL ADVICE (OUTPATIENT)
Dept: FAMILY MEDICINE | Facility: CLINIC | Age: 29
End: 2024-01-30
Payer: COMMERCIAL

## 2024-01-30 DIAGNOSIS — E10.9 TYPE 1 DIABETES MELLITUS WITHOUT COMPLICATION (H): ICD-10-CM

## 2024-01-30 RX ORDER — INSULIN DEGLUDEC 200 U/ML
40 INJECTION, SOLUTION SUBCUTANEOUS EVERY MORNING
Qty: 15 ML | Refills: 1 | Status: SHIPPED | OUTPATIENT
Start: 2024-01-30 | End: 2024-02-02

## 2024-01-30 RX ORDER — INSULIN DEGLUDEC 200 U/ML
40 INJECTION, SOLUTION SUBCUTANEOUS EVERY MORNING
Qty: 15 ML | Refills: 1 | Status: SHIPPED | OUTPATIENT
Start: 2024-01-30 | End: 2024-01-30

## 2024-01-30 NOTE — TELEPHONE ENCOUNTER
Please see My Chart message for refill:        Tresiba is listed as patient reported.      LOV: 8/30/23  (E10.9) Type 1 diabetes mellitus without complication (H)  (primary encounter diagnosis)  Comment: Endocrinology as scheduled he may be seen here in 6 months and prior as needed

## 2024-01-30 NOTE — TELEPHONE ENCOUNTER
01/30/24  Pt states he is using more insulin (Novolog) than 25 units. Pt would like to increase this. Please advise.

## 2024-02-02 DIAGNOSIS — E10.9 TYPE 1 DIABETES MELLITUS WITHOUT COMPLICATION (H): Primary | ICD-10-CM

## 2024-02-20 NOTE — PROGRESS NOTES
Outcome for 02/29/24 10:28 AM:  pt states he doesn't have data for today.   BOBBILYNN GROSSAINT, VF  Outcome for 02/20/24 1:16 PM: [employer] message sent  Blaire Gale MA  Outcome for 02/27/24 4:38 PM:  Patient will schedule nurse visit.   Patricia Quiñones LPN     Virtual Visit Details    Type of service:  Video Visit     Originating Location (pt. Location):     Distant Location (provider location):    Platform used for Video Visit:

## 2024-02-27 ENCOUNTER — TELEPHONE (OUTPATIENT)
Dept: ENDOCRINOLOGY | Facility: CLINIC | Age: 29
End: 2024-02-27
Payer: COMMERCIAL

## 2024-02-27 NOTE — TELEPHONE ENCOUNTER
Spoke with patient in regards to obtaining dexcom data for appointment scheduled on 2/29/24. Patient is unable to upload dexcom at home, computer not compatible. List of locations for patient to have endocrinology nurse upload were sent via [employer].     Patricia Quiñones LPN 02/27/24 4:34 PM

## 2024-02-29 ENCOUNTER — TELEPHONE (OUTPATIENT)
Dept: ENDOCRINOLOGY | Facility: CLINIC | Age: 29
End: 2024-02-29

## 2024-02-29 ENCOUNTER — VIRTUAL VISIT (OUTPATIENT)
Dept: ENDOCRINOLOGY | Facility: CLINIC | Age: 29
End: 2024-02-29
Payer: COMMERCIAL

## 2024-02-29 VITALS — BODY MASS INDEX: 23.54 KG/M2 | WEIGHT: 150 LBS | HEIGHT: 67 IN

## 2024-02-29 DIAGNOSIS — E10.9 TYPE 1 DIABETES MELLITUS WITHOUT COMPLICATION (H): ICD-10-CM

## 2024-02-29 PROCEDURE — 99214 OFFICE O/P EST MOD 30 MIN: CPT | Mod: 95 | Performed by: PHYSICIAN ASSISTANT

## 2024-02-29 NOTE — NURSING NOTE
Is the patient currently in the state of [place]? YES    Visit mode:VIDEO    If the visit is dropped, the patient can be reconnected by: VIDEO VISIT: Text to cell phone:   Telephone Information:   Mobile [phone]       Will anyone else be joining the visit? NO  (If patient encounters technical issues they should call [phone]    How would you like to obtain your AVS? MyChart    Are changes needed to the allergy or medication list? No    Reason for visit: recheck  Bobbilynn Grossaint VVF

## 2024-02-29 NOTE — LETTER
2/29/2024       RE: Aashish Vanegas  [address] [facility] [address]     Dear Colleague,    Thank you for referring your patient, Aashish Vanegas, to the [facility] [place] [facility]. Please see a copy of my visit note below.    Outcome for 02/29/24 10:28 AM:  pt states he doesn't have data for today.   BOBBILYNN GROSSAINT, VF  Outcome for 02/20/24 1:16 PM: [employer] message sent  Blaire Gale MA  Outcome for 02/27/24 4:38 PM:  Patient will schedule nurse visit.   Patricia Quiñones LPN     Virtual Visit Details    Type of service:  Video Visit     Originating Location (pt. Location):     Distant Location (provider location):    Platform used for Video Visit:       Time of start: 11:27 am  Time of end: 11:42 am   Total duration of video visit:15 minutes.  Providers location: offsite.  Patients location: [place].    HPI  Aashish Vanegas is a 28 year old male with type 1 diabetes mellitus being seen today as a new patient for diabetes evaluation and management.  Pt moved from [place] to [place] in early Aug 2023.  He is currently living in [place], [place] and is at a coffee shop in [place], [place] now during our video visit.  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  No children.  He was dx having type 1 DM at age 10.  His diabetes is complicated by diabetic retinopathy. No hx of nephropathy or neuropathy.  Pt has hx of ADHD, depression, admissions for DKA- last episode was Fall of 2022 stating he was no longer on his parents health insurance and was without insulin for a few days which caused DKA. He also has hx of low Vit B12 and low Vit D. He states he has never been tested for celiac.  For his diabetes, he is currently taking  Lantus 40 units subcutaneous each am. Apparently his insurance will no longer cover Tresiba.  He also takes Novolog 1 unit/4 gms CHO with meals with correction 1 unit/40 for  BG > 140.  Most recent A1C was 8.9 % in Nov 2023   Pt's A1C was 7.3 % on 7/27/2023 and 6.7 % in 3/2023.  Pt's A1C was 6.7 % in Nov 2022, 7.6 % in 9/2021, 8.1 % in 3/2021 and 9.5 % in Oct 2020.  Aashish's C-peptide was < 0.10 ng/mL in 3/2021 and MERRICK Ab + 3/2021.  Pt was unable to upload his DexcomG6 sensor data before his appointment today. He plans to get his DexcomG6 sensor data to me later this week.  On ROS today, no complaints.  Weight stable per patient.  He states he eats healthy. Maybe 1 regular soda 3 times per month.  He is active.  Pt denies blurred vision, n/v, SOB at rest, cough or fever.  No chest pain, abd pain, diarrhea, dysuria or hematuria.  Aashish denies neuropathy sx or foot ulcers.  No hx of HTN.    Diabetes Care  Retinopathy: yes- seen in Aug 2022 with PDR left eye and NPDR right eye. Oph referral placed today.  Nephropathy:no. Urine microalbuminuria negative in Nov 2023  Neuropathy: no.  Foot Exam: no exam today.  Taking aspirin: no.  Lipids:  in Nov 2023- pt NOT fasting.  Insulin: Basal and meal time insulin with correction. Pt is interested in the OmniPod5 insulin pump.  Testing: DexcomG6 sensor.    ROS  See under HPI.    Allergies  Allergies   Allergen Reactions    Penicillins Rash    Pollen Extract Other (See Comments)     Rhinorrhea, itchy eyes, sneezing.       Medications  Current Outpatient Medications   Medication Sig Dispense Refill    BD PEN NEEDLE ARCHIE 2ND GEN 32G X 4 MM miscellaneous Inject Subcutaneous 3 times daily Use 3 pen needles daily. 250 each 3    blood glucose (NO BRAND SPECIFIED) test strip One Touch Ultra 2 Use to test blood sugar 2 times daily. 200 strip 1    Continuous Blood Gluc Sensor (DEXCOM G6 SENSOR) MISC Change every 10 days. 6 each 4    Continuous Blood Gluc Transmit (DEXCOM G6 TRANSMITTER) MISC Change every 3 months. 1 each 4    Continuous Glucose Monitor Sup KIT 1 Act continuous 1 kit 3    Glucagon (BAQSIMI) 3 MG/DOSE nasal powder Spray 1 spray (3 mg) in  nostril as needed in the event of unconscious hypoglycemia or hypoglycemic seizure. May repeat dose if no response after 15 minutes. 1 each 1    insulin aspart (NOVOLOG PEN) 100 UNIT/ML pen Inject 1 unit/4 gms CHO with meals,plus correction (1unit/40 for BG > 140 ). Pt uses 25 units daily. 15 mL 4    insulin glargine (LANTUS PEN) 100 UNIT/ML pen Inject 40 Units Subcutaneous at bedtime 15 mL 3    lisdexamfetamine (VYVANSE) 10 MG capsule Take 10 mg by mouth every morning         Family History    Denies family hx of diabetes.      Social History    Smoke: none at this time.   ETOH: social. One to two drinks per week per patient.  Marital status: single.  Children: none.  Occupation: retail work.    Past Medical History  Type 1 DM dx age 10  Diabetic retinopathy  Low vit D  Low Vit B12  ADHD  Depression  Seasonal allergies.    No surgeries.      Physical Exam    No exam today.    RESULTS    A1C 7.3 % on 7/27/2023    TSH normal in July 2023    Creat 0.8 / GFR > 60 mL/min in 7/2023    C-peptide < 0.10 ng/mL and FARHANA Ab + in 3/2021.      ASSESSMENT/PLAN:      TYPE 1 DIABETES MELLITUS: 28 year old male with type 1 DM dx at age 10 complicated by diabetic retinopathy. No hx of nephropathy or neuropathy. Past hx of admissions for DKA. Last DKA episode was in Fall 2022 due to lack of insulin/medical insurance.  I have no DexcomG6 sensor data to review at his time. He states he will upload his sensor data for me to review later this week. For now will continue current insulin doses. He is interested in the OmniPod5 insulin pump. He will check with his insurance regarding coverage for the OmniPod5 insulin pump. His urine microalbuminuria has been negative with normal creat/GFR and no sx of diabetic neuropathy or foot ulcers.  No vitals today.  RETINOPATHY: Referral to see Oph here placed today.  MENTAL HEALTH: Stable at this time per patient.  FOLLOW UP: With me in 3 months and with me in clinic in Sept 2024.      Time spent  reviewing chart and labs today = 5 minutes.  Time for video visit today =14 minutes.  Time for documentation today = 15 minutes.    Total time for visit today = 34 minutes.    Sommer Dsouza PA-C

## 2024-02-29 NOTE — PROGRESS NOTES
Time of start: 11:27 am  Time of end: 11:42 am   Total duration of video visit:15 minutes.  Providers location: offsite.  Patients location: [place].    [facility]  Aashish Vanegas is a 28 year old male with type 1 diabetes mellitus being seen today as a new patient for diabetes evaluation and management.  Pt moved from [place] to [place] in early Aug 2023.  He is currently living in [place], [place] and is at a coffee shop in [place], [place] now during our video visit.  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  No children.  He was dx having type 1 DM at age 10.  His diabetes is complicated by diabetic retinopathy. No hx of nephropathy or neuropathy.  Pt has hx of ADHD, depression, admissions for DKA- last episode was Fall of 2022 stating he was no longer on his parents health insurance and was without insulin for a few days which caused DKA. He also has hx of low Vit B12 and low Vit D. He states he has never been tested for celiac.  For his diabetes, he is currently taking  Lantus 40 units subcutaneous each am. Apparently his insurance will no longer cover Tresiba.  He also takes Novolog 1 unit/4 gms CHO with meals with correction 1 unit/40 for BG > 140.  Most recent A1C was 8.9 % in Nov 2023   Pt's A1C was 7.3 % on 7/27/2023 and 6.7 % in 3/2023.  Pt's A1C was 6.7 % in Nov 2022, 7.6 % in 9/2021, 8.1 % in 3/2021 and 9.5 % in Oct 2020.  Aashish's C-peptide was < 0.10 ng/mL in 3/2021 and MERRICK Ab + 3/2021.  Pt was unable to upload his DexcomG6 sensor data before his appointment today. He plans to get his DexcomG6 sensor data to me later this week.  On ROS today, no complaints.  Weight stable per patient.  He states he eats healthy. Maybe 1 regular soda 3 times per month.  He is active.  Pt denies blurred vision, n/v, SOB at rest, cough or fever.  No chest pain, abd pain, diarrhea, dysuria or hematuria.  Aashish denies neuropathy sx or foot ulcers.  No hx of HTN.    Diabetes Care  Retinopathy: yes- seen in Aug  2022 with PDR left eye and NPDR right eye. Oph referral placed today.  Nephropathy:no. Urine microalbuminuria negative in Nov 2023  Neuropathy: no.  Foot Exam: no exam today.  Taking aspirin: no.  Lipids:  in Nov 2023- pt NOT fasting.  Insulin: Basal and meal time insulin with correction. Pt is interested in the OmniPod5 insulin pump.  Testing: DexcomG6 sensor.    ROS  See under HPI.    Allergies  Allergies   Allergen Reactions    Penicillins Rash    Pollen Extract Other (See Comments)     Rhinorrhea, itchy eyes, sneezing.       Medications  Current Outpatient Medications   Medication Sig Dispense Refill    BD PEN NEEDLE ARCHIE 2ND GEN 32G X 4 MM miscellaneous Inject Subcutaneous 3 times daily Use 3 pen needles daily. 250 each 3    blood glucose (NO BRAND SPECIFIED) test strip One Touch Ultra 2 Use to test blood sugar 2 times daily. 200 strip 1    Continuous Blood Gluc Sensor (DEXCOM G6 SENSOR) MISC Change every 10 days. 6 each 4    Continuous Blood Gluc Transmit (DEXCOM G6 TRANSMITTER) MISC Change every 3 months. 1 each 4    Continuous Glucose Monitor Sup KIT 1 Act continuous 1 kit 3    Glucagon (BAQSIMI) 3 MG/DOSE nasal powder Spray 1 spray (3 mg) in nostril as needed in the event of unconscious hypoglycemia or hypoglycemic seizure. May repeat dose if no response after 15 minutes. 1 each 1    insulin aspart (NOVOLOG PEN) 100 UNIT/ML pen Inject 1 unit/4 gms CHO with meals,plus correction (1unit/40 for BG > 140 ). Pt uses 25 units daily. 15 mL 4    insulin glargine (LANTUS PEN) 100 UNIT/ML pen Inject 40 Units Subcutaneous at bedtime 15 mL 3    lisdexamfetamine (VYVANSE) 10 MG capsule Take 10 mg by mouth every morning         Family History    Denies family hx of diabetes.      Social History    Smoke: none at this time.   ETOH: social. One to two drinks per week per patient.  Marital status: single.  Children: none.  Occupation: retail work.    Past Medical History  Type 1 DM dx age 10  Diabetic retinopathy  Low  vit D  Low Vit B12  ADHD  Depression  Seasonal allergies.    No surgeries.      Physical Exam    No exam today.    RESULTS    A1C 7.3 % on 7/27/2023    TSH normal in July 2023    Creat 0.8 / GFR > 60 mL/min in 7/2023    C-peptide < 0.10 ng/mL and FARHANA Ab + in 3/2021.      ASSESSMENT/PLAN:      TYPE 1 DIABETES MELLITUS: 28 year old male with type 1 DM dx at age 10 complicated by diabetic retinopathy. No hx of nephropathy or neuropathy. Past hx of admissions for DKA. Last DKA episode was in Fall 2022 due to lack of insulin/medical insurance.  I have no DexcomG6 sensor data to review at his time. He states he will upload his sensor data for me to review later this week. For now will continue current insulin doses. He is interested in the OmniPod5 insulin pump. He will check with his insurance regarding coverage for the OmniPod5 insulin pump. His urine microalbuminuria has been negative with normal creat/GFR and no sx of diabetic neuropathy or foot ulcers.  No vitals today.  RETINOPATHY: Referral to see Oph here placed today.  MENTAL HEALTH: Stable at this time per patient.  FOLLOW UP: With me in 3 months and with me in clinic in Sept 2024.      Time spent reviewing chart and labs today = 5 minutes.  Time for video visit today =14 minutes.  Time for documentation today = 15 minutes.    Total time for visit today = 34 minutes.    Sommer Dsouza PA-C

## 2024-03-01 ENCOUNTER — TELEPHONE (OUTPATIENT)
Dept: ENDOCRINOLOGY | Facility: CLINIC | Age: 29
End: 2024-03-01
Payer: COMMERCIAL

## 2024-03-01 NOTE — TELEPHONE ENCOUNTER
Spoke with patient and scheduled follow-up appointments with Sommer Dsouza- per the checkout order. Patient declined scheduling eye referral at this time- wants to verify cvg with insurance first.

## 2024-03-10 ENCOUNTER — HEALTH MAINTENANCE LETTER (OUTPATIENT)
Age: 29
End: 2024-03-10

## 2024-03-19 ENCOUNTER — MYC REFILL (OUTPATIENT)
Dept: ENDOCRINOLOGY | Facility: CLINIC | Age: 29
End: 2024-03-19
Payer: COMMERCIAL

## 2024-03-19 DIAGNOSIS — E10.9 TYPE 1 DIABETES MELLITUS WITHOUT COMPLICATION (H): ICD-10-CM

## 2024-03-19 RX ORDER — PEN NEEDLE, DIABETIC 32GX 5/32"
NEEDLE, DISPOSABLE MISCELLANEOUS 3 TIMES DAILY
Qty: 400 EACH | Refills: 3 | Status: SHIPPED | OUTPATIENT
Start: 2024-03-19 | End: 2024-04-21

## 2024-03-29 ENCOUNTER — MYC REFILL (OUTPATIENT)
Dept: FAMILY MEDICINE | Facility: CLINIC | Age: 29
End: 2024-03-29
Payer: COMMERCIAL

## 2024-03-29 DIAGNOSIS — E10.9 TYPE 1 DIABETES MELLITUS WITHOUT COMPLICATION (H): ICD-10-CM

## 2024-04-21 ENCOUNTER — MYC REFILL (OUTPATIENT)
Dept: ENDOCRINOLOGY | Facility: CLINIC | Age: 29
End: 2024-04-21
Payer: COMMERCIAL

## 2024-04-21 ENCOUNTER — MYC REFILL (OUTPATIENT)
Dept: FAMILY MEDICINE | Facility: CLINIC | Age: 29
End: 2024-04-21
Payer: COMMERCIAL

## 2024-04-21 DIAGNOSIS — E10.9 TYPE 1 DIABETES MELLITUS WITHOUT COMPLICATION (H): ICD-10-CM

## 2024-04-22 RX ORDER — PEN NEEDLE, DIABETIC 32GX 5/32"
NEEDLE, DISPOSABLE MISCELLANEOUS 3 TIMES DAILY
Qty: 400 EACH | Refills: 3 | Status: SHIPPED | OUTPATIENT
Start: 2024-04-22 | End: 2024-06-27

## 2024-04-22 NOTE — TELEPHONE ENCOUNTER
Signed 3 days ago (4/19/2024):   insulin glargine (LANTUS PEN) 100 UNIT/ML pen       Duplicate refill request.

## 2024-05-17 NOTE — PROGRESS NOTES
Outcome for 05/17/24 2:47 PM: [employer] message sent  Patricia Quiñones LPN   Outcome for 05/29/24 1:30 PM: Per patient, will upload device before appointment  Blaire Gale MA  Outcome for 05/30/24 12:39 PM:  Pt is unable to upload dexcom from home or come into clinic. Patient will send dexcom 14 day average via kesha Gale MA  Outcome for 05/30/24 12:59 PM: Glucose readings sent via Kesha Gale MA

## 2024-05-29 ENCOUNTER — MYC REFILL (OUTPATIENT)
Dept: ENDOCRINOLOGY | Facility: CLINIC | Age: 29
End: 2024-05-29
Payer: COMMERCIAL

## 2024-05-29 ENCOUNTER — TELEPHONE (OUTPATIENT)
Dept: ENDOCRINOLOGY | Facility: CLINIC | Age: 29
End: 2024-05-29
Payer: COMMERCIAL

## 2024-05-29 DIAGNOSIS — E10.9 TYPE 1 DIABETES MELLITUS WITHOUT COMPLICATION (H): ICD-10-CM

## 2024-05-30 NOTE — TELEPHONE ENCOUNTER
Pt requested nurse only visit at [place] to upload dexcom for virtual appointment on 5/31/24 for meghan wilson. Appt scheduled for 2:30pm. Blaire Gale CMA on 5/30/2024 at 11:21 AM

## 2024-05-30 NOTE — TELEPHONE ENCOUNTER
The patient would like a call back from the care team to get his Device data uploaded, please call today thank you.

## 2024-05-30 NOTE — TELEPHONE ENCOUNTER
Spoke with patient. Pt is unable to come into clinic for upload. Pt will send dexcom averages via [employer]. Blaire Gale CMA on 5/30/2024 at 12:40 PM

## 2024-05-31 ENCOUNTER — VIRTUAL VISIT (OUTPATIENT)
Dept: ENDOCRINOLOGY | Facility: CLINIC | Age: 29
End: 2024-05-31
Payer: COMMERCIAL

## 2024-05-31 DIAGNOSIS — E10.9 TYPE 1 DIABETES MELLITUS WITHOUT COMPLICATION (H): ICD-10-CM

## 2024-05-31 PROCEDURE — 99214 OFFICE O/P EST MOD 30 MIN: CPT | Mod: 95 | Performed by: PHYSICIAN ASSISTANT

## 2024-05-31 NOTE — NURSING NOTE
Is the patient currently in the state of [place]? YES    Visit mode:VIDEO    If the visit is dropped, the patient can be reconnected by: VIDEO VISIT: Text to cell phone:   Telephone Information:   Mobile [phone]       Will anyone else be joining the visit? NO  (If patient encounters technical issues they should call [phone]    How would you like to obtain your AVS? MyChart    Are changes needed to the allergy or medication list? No, patient reported no changes to e-check in information for visit. VF did not review e-check in information again with patient due to this.     Are refills needed on medications prescribed by this physician? Yes, Novolog    Reason for visit: RECHECK    Mere AARON

## 2024-05-31 NOTE — PROGRESS NOTES
Time of start: 11:09 am   Time of end: 11:19 am   Total duration of video visit: 10 minutes.  Providers location: Off-site.  Patient's location: [place].    ALESSIO Vanegas is a 29 year old male with type 1 diabetes mellitus. Video visit for diabetes follow up today.  Pt moved from [place] to [place] in early Aug 2023.  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  No children.  He was dx having type 1 DM at age 10.  His diabetes is complicated by diabetic retinopathy. No hx of nephropathy or neuropathy.  Pt has hx of ADHD, depression, admissions for DKA- last episode was Fall of 2022 stating he was no longer on his parents health insurance and was without insulin for a few days which caused DKA. He also has hx of low Vit B12 and low Vit D. He states he has never been tested for celiac.  For his diabetes, he is currently taking  Lantus 40 units subcutaneous each am. Apparently his insurance will no longer cover Tresiba.  He also takes Novolog 1 unit/4 gms CHO with meals with correction 1 unit/40 for BG > 140.  Most recent A1C was 8.9 % in Nov 2023   Pt's A1C was 7.3 % on 7/27/2023 and 6.7 % in 3/2023.  Pt's A1C was 6.7 % in Nov 2022, 7.6 % in 9/2021, 8.1 % in 3/2021 and 9.5 % in Oct 2020.  Aashish's C-peptide was < 0.10 ng/mL in 3/2021 and MERRICK Ab + 3/2021.  Pt was unable to upload his DexcomG6 sensor data before his appointment today. He plans to get his DexcomG6 sensor data to me later.  On ROS today, no complaints.  Weight stable per patient.  He states he eats healthy. He is active.  Pt denies blurred vision, n/v, SOB at rest, cough or fever.  No chest pain, abd pain, diarrhea, dysuria or hematuria.  Aashish denies neuropathy sx or foot ulcers.  No hx of HTN.    Diabetes Care  Retinopathy: yes- seen in Aug 2022 with PDR left eye and NPDR right eye. Oph referral placed.  Nephropathy: none. Urine microalbuminuria negative in Nov 2023.  Neuropathy: none.  Foot Exam: no exam today.  Taking  aspirin: no.  Lipids:  in Nov 2023- pt NOT fasting.  Insulin: Basal and meal time insulin with correction. Pt is interested in the OmniPod5 insulin pump.  Referred to CDE/RD.  Testing: DexcomG6 sensor.    ROS  See under HPI.    Allergies  Allergies   Allergen Reactions    Penicillins Rash    Pollen Extract Other (See Comments)     Rhinorrhea, itchy eyes, sneezing.       Medications  Current Outpatient Medications   Medication Sig Dispense Refill    BD PEN NEEDLE ARCHIE 2ND GEN 32G X 4 MM miscellaneous Inject Subcutaneous 3 times daily Use 4 pen needles daily for insulin administration. 400 each 3    blood glucose (NO BRAND SPECIFIED) test strip One Touch Ultra 2 Use to test blood sugar 2 times daily. 200 strip 1    Continuous Blood Gluc Sensor (DEXCOM G6 SENSOR) MISC Change every 10 days. 6 each 4    Continuous Blood Gluc Transmit (DEXCOM G6 TRANSMITTER) MISC Change every 3 months. 1 each 4    Continuous Glucose Monitor Sup KIT 1 Act continuous 1 kit 3    Glucagon (BAQSIMI) 3 MG/DOSE nasal powder Spray 1 spray (3 mg) in nostril as needed in the event of unconscious hypoglycemia or hypoglycemic seizure. May repeat dose if no response after 15 minutes. 1 each 1    insulin aspart (NOVOLOG PEN) 100 UNIT/ML pen Inject 1 unit/4 gms CHO with meals,plus correction (1unit/40 for BG > 140 ). Pt uses 60 units daily. 60 mL 4    insulin glargine (LANTUS PEN) 100 UNIT/ML pen Inject 40 Units Subcutaneous at bedtime 15 mL 3    lisdexamfetamine (VYVANSE) 10 MG capsule Take 10 mg by mouth every morning         Family History    Denies family hx of diabetes.      Social History    Smoke: none at this time.   ETOH: social. One to two drinks per week per patient.  Marital status: single.  Children: none.  Occupation: retail work.    Past Medical History  Type 1 DM dx age 10  Diabetic retinopathy  Low vit D  Low Vit B12  ADHD  Depression  Seasonal allergies.    No surgeries.      Physical Exam    No exam today.    RESULTS    A1C 7.3  % on 7/27/2023    TSH normal in July 2023    Creat 0.8 / GFR > 60 mL/min in 7/2023    C-peptide < 0.10 ng/mL and FARHANA Ab + in 3/2021.      ASSESSMENT/PLAN:      TYPE 1 DIABETES MELLITUS: 29 year old male with type 1 DM dx at age 10 complicated by diabetic retinopathy. No hx of nephropathy or neuropathy. Past hx of admissions for DKA. Last DKA episode was in Fall 2022 due to lack of insulin/medical insurance.  I have no DexcomG6 sensor data to review at his time. He states he will upload his sensor data for me to review later. For now will continue current insulin doses. He is interested in the OmniPod5 insulin pump. Referral to CDE/RD placed today. His urine microalbuminuria has been negative with normal creat/GFR and no sx of diabetic neuropathy or foot ulcers.  No vitals today.  RETINOPATHY: Referral to see Oph.  MENTAL HEALTH: Stable at this time per patient.  FOLLOW UP: With me in Sept 2024 in clinic. Referral to see CDE/RD placed today. Reminded pt he has fasting diabetic labs ordered.   Also reminded pt to send me his DexcomG6 sensor data to review.    Time spent reviewing chart and labs today = 5 minutes.  Time for video visit today = 10 minutes.  Time for documentation today = 15 minutes.    Total time for visit today = 30 minutes.    Sommer Dsouza PA-C

## 2024-05-31 NOTE — LETTER
5/31/2024       RE: Aashish Vanegas  [address] [facility] [address]     Dear Colleague,    Thank you for referring your patient, Aashish Vanegas, to the [facility] ENDOCRINOLOGY CLINIC [place] at [facility]. Please see a copy of my visit note below.    Outcome for 05/17/24 2:47 PM: [employer] message sent  Patricia Quiñones LPN   Outcome for 05/29/24 1:30 PM: Per patient, will upload device before appointment  Blaire Gale MA  Outcome for 05/30/24 12:39 PM:  Pt is unable to upload dexcom from home or come into clinic. Patient will send dexcom 14 day average via [employer]  Blaire Gale MA  Outcome for 05/30/24 12:59 PM: Glucose readings sent via Kesha Gale MA          Time of start: 11:09 am   Time of end: 11:19 am   Total duration of video visit: 10 minutes.  Providers location: Off-site.  Patient's location: [place].    ALESSIO Vanegas is a 29 year old male with type 1 diabetes mellitus. Video visit for diabetes follow up today.  Pt moved from [place] to [place] in early Aug 2023.  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  No children.  He was dx having type 1 DM at age 10.  His diabetes is complicated by diabetic retinopathy. No hx of nephropathy or neuropathy.  Pt has hx of ADHD, depression, admissions for DKA- last episode was Fall of 2022 stating he was no longer on his parents health insurance and was without insulin for a few days which caused DKA. He also has hx of low Vit B12 and low Vit D. He states he has never been tested for celiac.  For his diabetes, he is currently taking  Lantus 40 units subcutaneous each am. Apparently his insurance will no longer cover Tresiba.  He also takes Novolog 1 unit/4 gms CHO with meals with correction 1 unit/40 for BG > 140.  Most recent A1C was 8.9 % in Nov 2023   Pt's A1C was 7.3 % on 7/27/2023 and 6.7 % in 3/2023.  Pt's A1C was 6.7 % in Nov  2022, 7.6 % in 9/2021, 8.1 % in 3/2021 and 9.5 % in Oct 2020.  Aashish's C-peptide was < 0.10 ng/mL in 3/2021 and MERRICK Ab + 3/2021.  Pt was unable to upload his DexcomG6 sensor data before his appointment today. He plans to get his DexcomG6 sensor data to me later.  On ROS today, no complaints.  Weight stable per patient.  He states he eats healthy. He is active.  Pt denies blurred vision, n/v, SOB at rest, cough or fever.  No chest pain, abd pain, diarrhea, dysuria or hematuria.  Aashish denies neuropathy sx or foot ulcers.  No hx of HTN.    Diabetes Care  Retinopathy: yes- seen in Aug 2022 with PDR left eye and NPDR right eye. Oph referral placed.  Nephropathy: none. Urine microalbuminuria negative in Nov 2023.  Neuropathy: none.  Foot Exam: no exam today.  Taking aspirin: no.  Lipids:  in Nov 2023- pt NOT fasting.  Insulin: Basal and meal time insulin with correction. Pt is interested in the OmniPod5 insulin pump.  Referred to CDE/RD.  Testing: DexcomG6 sensor.    ROS  See under HPI.    Allergies  Allergies   Allergen Reactions    Penicillins Rash    Pollen Extract Other (See Comments)     Rhinorrhea, itchy eyes, sneezing.       Medications  Current Outpatient Medications   Medication Sig Dispense Refill    BD PEN NEEDLE ARCHIE 2ND GEN 32G X 4 MM miscellaneous Inject Subcutaneous 3 times daily Use 4 pen needles daily for insulin administration. 400 each 3    blood glucose (NO BRAND SPECIFIED) test strip One Touch Ultra 2 Use to test blood sugar 2 times daily. 200 strip 1    Continuous Blood Gluc Sensor (DEXCOM G6 SENSOR) MISC Change every 10 days. 6 each 4    Continuous Blood Gluc Transmit (DEXCOM G6 TRANSMITTER) MISC Change every 3 months. 1 each 4    Continuous Glucose Monitor Sup KIT 1 Act continuous 1 kit 3    Glucagon (BAQSIMI) 3 MG/DOSE nasal powder Spray 1 spray (3 mg) in nostril as needed in the event of unconscious hypoglycemia or hypoglycemic seizure. May repeat dose if no response after 15 minutes. 1  each 1    insulin aspart (NOVOLOG PEN) 100 UNIT/ML pen Inject 1 unit/4 gms CHO with meals,plus correction (1unit/40 for BG > 140 ). Pt uses 60 units daily. 60 mL 4    insulin glargine (LANTUS PEN) 100 UNIT/ML pen Inject 40 Units Subcutaneous at bedtime 15 mL 3    lisdexamfetamine (VYVANSE) 10 MG capsule Take 10 mg by mouth every morning         Family History    Denies family hx of diabetes.      Social History    Smoke: none at this time.   ETOH: social. One to two drinks per week per patient.  Marital status: single.  Children: none.  Occupation: retail work.    Past Medical History  Type 1 DM dx age 10  Diabetic retinopathy  Low vit D  Low Vit B12  ADHD  Depression  Seasonal allergies.    No surgeries.      Physical Exam    No exam today.    RESULTS    A1C 7.3 % on 7/27/2023    TSH normal in July 2023    Creat 0.8 / GFR > 60 mL/min in 7/2023    C-peptide < 0.10 ng/mL and FARHANA Ab + in 3/2021.      ASSESSMENT/PLAN:      TYPE 1 DIABETES MELLITUS: 29 year old male with type 1 DM dx at age 10 complicated by diabetic retinopathy. No hx of nephropathy or neuropathy. Past hx of admissions for DKA. Last DKA episode was in Fall 2022 due to lack of insulin/medical insurance.  I have no DexcomG6 sensor data to review at his time. He states he will upload his sensor data for me to review later. For now will continue current insulin doses. He is interested in the OmniPod5 insulin pump. Referral to CDE/RD placed today. His urine microalbuminuria has been negative with normal creat/GFR and no sx of diabetic neuropathy or foot ulcers.  No vitals today.  RETINOPATHY: Referral to see Oph.  MENTAL HEALTH: Stable at this time per patient.  FOLLOW UP: With me in Sept 2024 in clinic. Referral to see CDE/RD placed today. Reminded pt he has fasting diabetic labs ordered.   Also reminded pt to send me his DexcomG6 sensor data to review.    Time spent reviewing chart and labs today = 5 minutes.  Time for video visit today = 10 minutes.  Time  for documentation today = 15 minutes.    Total time for visit today = 30 minutes.    Sommer Dsouza PA-C

## 2024-06-12 ENCOUNTER — LAB (OUTPATIENT)
Dept: LAB | Facility: CLINIC | Age: 29
End: 2024-06-12
Payer: COMMERCIAL

## 2024-06-12 DIAGNOSIS — E10.9 TYPE 1 DIABETES MELLITUS WITHOUT COMPLICATION (H): ICD-10-CM

## 2024-06-12 LAB — HBA1C MFR BLD: 9.4 % (ref 0–5.6)

## 2024-06-12 PROCEDURE — 82570 ASSAY OF URINE CREATININE: CPT

## 2024-06-12 PROCEDURE — 36415 COLL VENOUS BLD VENIPUNCTURE: CPT

## 2024-06-12 PROCEDURE — 80061 LIPID PANEL: CPT

## 2024-06-12 PROCEDURE — 82565 ASSAY OF CREATININE: CPT

## 2024-06-12 PROCEDURE — 84443 ASSAY THYROID STIM HORMONE: CPT

## 2024-06-12 PROCEDURE — 82043 UR ALBUMIN QUANTITATIVE: CPT

## 2024-06-12 PROCEDURE — 83036 HEMOGLOBIN GLYCOSYLATED A1C: CPT

## 2024-06-13 LAB
CHOLEST SERPL-MCNC: 258 MG/DL
CREAT SERPL-MCNC: 0.82 MG/DL (ref 0.51–1.17)
CREAT UR-MCNC: 72.3 MG/DL
EGFRCR SERPLBLD CKD-EPI 2021: >90 ML/MIN/1.73M2
FASTING STATUS PATIENT QL REPORTED: ABNORMAL
HDLC SERPL-MCNC: 49 MG/DL
LDLC SERPL CALC-MCNC: 184 MG/DL
MICROALBUMIN UR-MCNC: <12 MG/L
MICROALBUMIN/CREAT UR: NORMAL MG/G{CREAT}
NONHDLC SERPL-MCNC: 209 MG/DL
TRIGL SERPL-MCNC: 124 MG/DL
TSH SERPL DL<=0.005 MIU/L-ACNC: 2.01 UIU/ML (ref 0.3–4.2)

## 2024-06-21 ENCOUNTER — TELEPHONE (OUTPATIENT)
Dept: ENDOCRINOLOGY | Facility: CLINIC | Age: 29
End: 2024-06-21
Payer: COMMERCIAL

## 2024-06-21 ENCOUNTER — MYC REFILL (OUTPATIENT)
Dept: ENDOCRINOLOGY | Facility: CLINIC | Age: 29
End: 2024-06-21
Payer: COMMERCIAL

## 2024-06-21 DIAGNOSIS — E10.9 TYPE 1 DIABETES MELLITUS WITHOUT COMPLICATION (H): ICD-10-CM

## 2024-06-21 NOTE — TELEPHONE ENCOUNTER
Left Voicemail (1st Attempt) and Sent Mychart (1st Attempt) for the patient to call back and schedule the following:    Appointment type: New Diabetes Ed  Provider: Per Sommer BAUTISTA  Return date: next avail  Specialty phone number: [phone]  Additional appointment(s) needed:   Additonal Notes:     Please sched per referral from Sommer BAUTISTA

## 2024-06-24 ENCOUNTER — TELEPHONE (OUTPATIENT)
Dept: EDUCATION SERVICES | Facility: CLINIC | Age: 29
End: 2024-06-24
Payer: COMMERCIAL

## 2024-06-24 NOTE — TELEPHONE ENCOUNTER
Left Voicemail (1st Attempt) and Sent Mychart (1st Attempt) for the patient to call back and schedule the following:    Appointment type: diabetes ed   Provider: any CDE   Return date: first available   Specialty phone number: [phone]  Additional appointment(s) needed:   Additonal Notes:     Carrie Espinoza on 6/24/2024 at 4:43 PM

## 2024-06-27 ENCOUNTER — MYC REFILL (OUTPATIENT)
Dept: ENDOCRINOLOGY | Facility: CLINIC | Age: 29
End: 2024-06-27
Payer: COMMERCIAL

## 2024-06-27 DIAGNOSIS — E10.9 TYPE 1 DIABETES MELLITUS WITHOUT COMPLICATION (H): ICD-10-CM

## 2024-06-27 RX ORDER — PEN NEEDLE, DIABETIC 32GX 5/32"
NEEDLE, DISPOSABLE MISCELLANEOUS 3 TIMES DAILY
Qty: 400 EACH | Refills: 3 | Status: SHIPPED | OUTPATIENT
Start: 2024-06-27 | End: 2024-08-22

## 2024-06-27 NOTE — TELEPHONE ENCOUNTER
Left Voicemail (2nd Attempt) for the patient to call back and schedule the following:    Appointment type: diabetes ed   Provider: any CDE   Return date: first available   Specialty phone number: [phone]  Additional appointment(s) needed: na  Additonal Notes: LVM x2, MyC x1  Hi,    You can contact Dane and schedule him for a virtual or in person diabetes education appointment with the [facility] diabetes educators. Per Rebecca Benavides on 6/27/2024 at 9:40 AM

## 2024-07-16 DIAGNOSIS — E10.9 TYPE 1 DIABETES MELLITUS WITHOUT COMPLICATION (H): ICD-10-CM

## 2024-07-16 RX ORDER — INSULIN GLARGINE-YFGN 100 [IU]/ML
40 INJECTION, SOLUTION SUBCUTANEOUS AT BEDTIME
Qty: 12 ML | Refills: 0 | Status: SHIPPED | OUTPATIENT
Start: 2024-07-16 | End: 2024-09-24

## 2024-09-10 ENCOUNTER — MYC REFILL (OUTPATIENT)
Dept: ENDOCRINOLOGY | Facility: CLINIC | Age: 29
End: 2024-09-10
Payer: COMMERCIAL

## 2024-09-10 DIAGNOSIS — E10.9 TYPE 1 DIABETES MELLITUS WITHOUT COMPLICATION (H): ICD-10-CM

## 2024-09-24 ENCOUNTER — OFFICE VISIT (OUTPATIENT)
Dept: ENDOCRINOLOGY | Facility: CLINIC | Age: 29
End: 2024-09-24
Payer: COMMERCIAL

## 2024-09-24 VITALS
BODY MASS INDEX: 30.23 KG/M2 | OXYGEN SATURATION: 98 % | HEART RATE: 77 BPM | DIASTOLIC BLOOD PRESSURE: 92 MMHG | WEIGHT: 193 LBS | SYSTOLIC BLOOD PRESSURE: 146 MMHG

## 2024-09-24 DIAGNOSIS — E10.9 TYPE 1 DIABETES MELLITUS WITHOUT COMPLICATION (H): Primary | ICD-10-CM

## 2024-09-24 LAB
EST. AVERAGE GLUCOSE BLD GHB EST-MCNC: 214 MG/DL
HBA1C MFR BLD: 9.1 %

## 2024-09-24 PROCEDURE — 99214 OFFICE O/P EST MOD 30 MIN: CPT | Performed by: PHYSICIAN ASSISTANT

## 2024-09-24 PROCEDURE — 83036 HEMOGLOBIN GLYCOSYLATED A1C: CPT | Performed by: PATHOLOGY

## 2024-09-24 RX ORDER — INSULIN GLARGINE-YFGN 100 [IU]/ML
40 INJECTION, SOLUTION SUBCUTANEOUS AT BEDTIME
Qty: 12 ML | Refills: 3 | Status: SHIPPED | OUTPATIENT
Start: 2024-09-24

## 2024-09-24 ASSESSMENT — PAIN SCALES - GENERAL: PAINLEVEL: NO PAIN (0)

## 2024-09-24 NOTE — NURSING NOTE
Chief Complaint   Patient presents with    Diabetes     Blood pressure (!) 146/92, pulse 77, weight 87.5 kg (193 lb), SpO2 98%.    Bonnie Srivastava

## 2024-09-24 NOTE — LETTER
9/24/2024       RE: Aashish Vanegas  [address] [facility] [address]     Dear Colleague,    Thank you for referring your patient, Aashish Vanegas, to the [facility] ENDOCRINOLOGY CLINIC [place] at [facility]. Please see a copy of my visit note below.        HPI  Aashish Vanegas is a 29 year old male with type 1 diabetes mellitus.  Clinic visit for diabetes follow up today.  Last visit with me was in May 2024.  Pt moved from [place] to [place] in early Aug 2023.  Pt lives with his girlfriend and one other friend and is working at a [employer].  No children.  He was dx having type 1 DM at age 10.  His diabetes is complicated by diabetic retinopathy. No hx of nephropathy or neuropathy.  Pt has hx of ADHD, depression, admissions for DKA- last episode was Fall of 2022 stating he was no longer on his parents health insurance and was without insulin for a few days which caused DKA. He also has hx of low Vit B12 and low Vit D. He states he has never been tested for celiac.  For his diabetes, he is currently taking  Lantus 40 units subcutaneous each pm. Apparently his insurance will no longer cover Tresiba.  He also takes Novolog 1 unit/4 gms CHO with meals with correction 1 unit/40 for BG > 140.  Pt's A1C is 9.1 % today.  Previous A1C was 8.9 % in Nov 2023.  Pt's A1C was 7.3 % on 7/27/2023 and 6.7 % in 3/2023.  Pt's A1C was 6.7 % in Nov 2022, 7.6 % in 9/2021, 8.1 % in 3/2021 and 9.5 % in Oct 2020.  I reviewed and scanned his DexcomG6 sensor download data in his note below.  Blood sugars are high. Issues with his ADHD and some missed insulin doses.  Average glucose 240.  Glucose in target range only 32 % of the time and below target range 1 % of the time.  Aashish's C-peptide was < 0.10 ng/mL in 3/2021 and MERRICK Ab + 3/2021.  On ROS today, needs treatment for his ADHD.  Weight stable per patient.  He states he eats healthy. He is active.  Pt  denies blurred vision, n/v, SOB at rest, cough or fever.  No chest pain, abd pain, diarrhea, dysuria or hematuria.  Aashish denies neuropathy sx or foot ulcers.  No hx of HTN.    Diabetes Care  Retinopathy: yes- seen in Aug 2022 with PDR left eye and NPDR right eye. Oph referral placed today.  Nephropathy: none. Urine microalbuminuria negative in June 2024.  Neuropathy: none.  Foot Exam: No ulcers and normal monofilamentous exam today.  Taking aspirin: no.  Lipids: LDL 84 in June 2024- pt NOT fasting.  Insulin: Basal and meal time insulin with correction. Pt is interested in the OmniPod5 insulin pump.  Referred to CDE/RD today.  Testing: DexcomG6 sensor.  Will plan to switch to DexcomG7 sensor.          ROS  See under HPI.    Allergies  Allergies   Allergen Reactions     Penicillins Rash     Pollen Extract Other (See Comments)     Rhinorrhea, itchy eyes, sneezing.       Medications  Current Outpatient Medications   Medication Sig Dispense Refill     BD PEN NEEDLE ARCHIE 2ND GEN 32G X 4 MM miscellaneous Inject subcutaneously 4 times daily. Use 4 pen needles daily for insulin administration. 400 each 3     blood glucose (NO BRAND SPECIFIED) test strip One Touch Ultra 2 Use to test blood sugar 2 times daily. 200 strip 1     Continuous Blood Gluc Sensor (DEXCOM G6 SENSOR) MISC Change every 10 days. 6 each 4     Continuous Blood Gluc Transmit (DEXCOM G6 TRANSMITTER) MISC Change every 3 months. 1 each 4     Continuous Glucose Monitor Sup KIT 1 Act continuous 1 kit 3     Glucagon (BAQSIMI) 3 MG/DOSE nasal powder Spray 1 spray (3 mg) in nostril as needed in the event of unconscious hypoglycemia or hypoglycemic seizure. May repeat dose if no response after 15 minutes. 1 each 1     insulin aspart (NOVOLOG PEN) 100 UNIT/ML pen Inject 1 unit/4 gms CHO with meals,plus correction (1unit/40 for BG > 140 ). Pt uses 60 units daily. 60 mL 4     Insulin Glargine-yfgn 100 UNIT/ML SOPN INJECT 40 UNITS SUBCUTANEOUS AT BEDTIME 12 mL 0      lisdexamfetamine (VYVANSE) 10 MG capsule Take 10 mg by mouth as needed.       Family History    Denies family hx of diabetes.    Social History  Smoke: none at this time.   ETOH: social. 1 - 2 drinks per week per patient.  Marital status: single.  He lives with his girlfriend and 1 other friend.  Children: none.  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    Past Medical History  Type 1 DM dx age 10  Diabetic retinopathy  Low vit D  Low Vit B12  ADHD  Depression  Seasonal allergies.    No surgeries.      Physical Exam  BP (!) 146/92   Pulse 77   Wt 87.5 kg (193 lb)   SpO2 98%   BMI 30.23 kg/m       FEET: No ulcers and normal monofilamentous exam today.    RESULTS    A1C 7.3 % on 7/27/2023    TSH normal in July 2023    Creat 0.8 / GFR > 60 mL/min in 7/2023    C-peptide < 0.10 ng/mL and FARHANA Ab + in 3/2021.    AC 9.1 % today 9/24/2024.        ASSESSMENT/PLAN:      TYPE 1 DIABETES MELLITUS: 29 year old male with uncontrolled type 1 DM dx at age 10 complicated by diabetic retinopathy. No hx of nephropathy or neuropathy. Past hx of admissions for DKA. Last DKA episode was in Fall 2022 due to lack of insulin/medical insurance.  Aashish needs treatment for his ADHD. Mental Health referral placed today.  I also had patient meet with Estephanie Powell RD/JOHNY to discuss use of an OmniPod 5 insulin pump and Dexcom G7 sensor.  For now will continue current insulin doses.  Reminded patient to take insulin for all food intake and to take his insulin 10 to 15 minutes prior to eating.  His urine microalbuminuria has been negative with normal creat/GFR and no sx of diabetic neuropathy or foot ulcers.   BP high her today. He is to check is BP at home.  RETINOPATHY: Referral to see Oph today.  MENTAL HEALTH: Referral to be seen by Mental Health staff today.  FOLLOW UP: With me in 3 months. Oph  and Mental Health referral placed today. Pt seen by JOHNY/RANI today.     Time spent reviewing chart and labs today= 5 minutes.  Time for clinic  visit today = 20 minutes.  Time for documentation today = 10 minutes.    Total time for visit today = 35 minutes.    Sommer Dsouza PA-C        Again, thank you for allowing me to participate in the care of your patient.      Sincerely,    Sommer Dsouza PA-C

## 2024-09-24 NOTE — PROGRESS NOTES
HPI  Aashish Vanegas is a 29 year old male with type 1 diabetes mellitus.  Clinic visit for diabetes follow up today.  Last visit with me was in May 2024.  Pt moved from [place] to [place] in early Aug 2023.  Pt lives with his girlfriend and one other friend and is working at a [employer] store.  No children.  He was dx having type 1 DM at age 10.  His diabetes is complicated by diabetic retinopathy. No hx of nephropathy or neuropathy.  Pt has hx of ADHD, depression, admissions for DKA- last episode was Fall of 2022 stating he was no longer on his parents health insurance and was without insulin for a few days which caused DKA. He also has hx of low Vit B12 and low Vit D. He states he has never been tested for celiac.  For his diabetes, he is currently taking  Lantus 40 units subcutaneous each pm. Apparently his insurance will no longer cover Tresiba.  He also takes Novolog 1 unit/4 gms CHO with meals with correction 1 unit/40 for BG > 140.  Pt's A1C is 9.1 % today.  Previous A1C was 8.9 % in Nov 2023.  Pt's A1C was 7.3 % on 7/27/2023 and 6.7 % in 3/2023.  Pt's A1C was 6.7 % in Nov 2022, 7.6 % in 9/2021, 8.1 % in 3/2021 and 9.5 % in Oct 2020.  I reviewed and scanned his DexcomG6 sensor download data in his note below.  Blood sugars are high. Issues with his ADHD and some missed insulin doses.  Average glucose 240.  Glucose in target range only 32 % of the time and below target range 1 % of the time.  Aashish's C-peptide was < 0.10 ng/mL in 3/2021 and MERRICK Ab + 3/2021.  On ROS today, needs treatment for his ADHD.  Weight stable per patient.  He states he eats healthy. He is active.  Pt denies blurred vision, n/v, SOB at rest, cough or fever.  No chest pain, abd pain, diarrhea, dysuria or hematuria.  Aashish denies neuropathy sx or foot ulcers.  No hx of HTN.    Diabetes Care  Retinopathy: yes- seen in Aug 2022 with PDR left eye and NPDR right eye. Oph referral placed today.  Nephropathy: none. Urine microalbuminuria  negative in June 2024.  Neuropathy: none.  Foot Exam: No ulcers and normal monofilamentous exam today.  Taking aspirin: no.  Lipids: LDL 84 in June 2024- pt NOT fasting.  Insulin: Basal and meal time insulin with correction. Pt is interested in the OmniPod5 insulin pump.  Referred to CDE/RD today.  Testing: DexcomG6 sensor.  Will plan to switch to DexcomG7 sensor.          ROS  See under HPI.    Allergies  Allergies   Allergen Reactions    Penicillins Rash    Pollen Extract Other (See Comments)     Rhinorrhea, itchy eyes, sneezing.       Medications  Current Outpatient Medications   Medication Sig Dispense Refill    BD PEN NEEDLE ARCHIE 2ND GEN 32G X 4 MM miscellaneous Inject subcutaneously 4 times daily. Use 4 pen needles daily for insulin administration. 400 each 3    blood glucose (NO BRAND SPECIFIED) test strip One Touch Ultra 2 Use to test blood sugar 2 times daily. 200 strip 1    Continuous Blood Gluc Sensor (DEXCOM G6 SENSOR) MISC Change every 10 days. 6 each 4    Continuous Blood Gluc Transmit (DEXCOM G6 TRANSMITTER) MISC Change every 3 months. 1 each 4    Continuous Glucose Monitor Sup KIT 1 Act continuous 1 kit 3    Glucagon (BAQSIMI) 3 MG/DOSE nasal powder Spray 1 spray (3 mg) in nostril as needed in the event of unconscious hypoglycemia or hypoglycemic seizure. May repeat dose if no response after 15 minutes. 1 each 1    insulin aspart (NOVOLOG PEN) 100 UNIT/ML pen Inject 1 unit/4 gms CHO with meals,plus correction (1unit/40 for BG > 140 ). Pt uses 60 units daily. 60 mL 4    Insulin Glargine-yfgn 100 UNIT/ML SOPN INJECT 40 UNITS SUBCUTANEOUS AT BEDTIME 12 mL 0    lisdexamfetamine (VYVANSE) 10 MG capsule Take 10 mg by mouth as needed.       Family History    Denies family hx of diabetes.    Social History  Smoke: none at this time.   ETOH: social. 1 - 2 drinks per week per patient.  Marital status: single.  He lives with his girlfriend and 1 other friend.  Children: none.  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    Past Medical History  Type 1 DM dx age 10  Diabetic retinopathy  Low vit D  Low Vit B12  ADHD  Depression  Seasonal allergies.    No surgeries.      Physical Exam  BP (!) 146/92   Pulse 77   Wt 87.5 kg (193 lb)   SpO2 98%   BMI 30.23 kg/m       FEET: No ulcers and normal monofilamentous exam today.    RESULTS    A1C 7.3 % on 7/27/2023    TSH normal in July 2023    Creat 0.8 / GFR > 60 mL/min in 7/2023    C-peptide < 0.10 ng/mL and FARHANA Ab + in 3/2021.    AC 9.1 % today 9/24/2024.        ASSESSMENT/PLAN:      TYPE 1 DIABETES MELLITUS: 29 year old male with uncontrolled type 1 DM dx at age 10 complicated by diabetic retinopathy. No hx of nephropathy or neuropathy. Past hx of admissions for DKA. Last DKA episode was in Fall 2022 due to lack of insulin/medical insurance.  Aashish needs treatment for his ADHD. Mental Health referral placed today.  I also had patient meet with Estephanie Powell RD/JOHNY to discuss use of an OmniPod 5 insulin pump and Dexcom G7 sensor.  For now will continue current insulin doses.  Reminded patient to take insulin for all food intake and to take his insulin 10 to 15 minutes prior to eating.  His urine microalbuminuria has been negative with normal creat/GFR and no sx of diabetic neuropathy or foot ulcers.   BP high her today. He is to check is BP at home.  RETINOPATHY: Referral to see Oph today.  MENTAL HEALTH: Referral to be seen by Mental Health staff today.  FOLLOW UP: With me in 3 months. Oph  and Mental Health referral placed today. Pt seen by CDE/RD today.     Time spent reviewing chart and labs today= 5 minutes.  Time for clinic visit today = 20 minutes.  Time for documentation today = 10 minutes.    Total time for visit today = 35 minutes.    Sommer Dsouza PA-C

## 2024-09-25 ENCOUNTER — TRANSFERRED RECORDS (OUTPATIENT)
Dept: HEALTH INFORMATION MANAGEMENT | Facility: CLINIC | Age: 29
End: 2024-09-25
Payer: COMMERCIAL

## 2024-09-25 LAB — RETINOPATHY: POSITIVE

## 2024-10-06 ENCOUNTER — HEALTH MAINTENANCE LETTER (OUTPATIENT)
Age: 29
End: 2024-10-06

## 2024-10-12 ENCOUNTER — MYC REFILL (OUTPATIENT)
Dept: ENDOCRINOLOGY | Facility: CLINIC | Age: 29
End: 2024-10-12
Payer: COMMERCIAL

## 2024-10-12 DIAGNOSIS — E10.9 TYPE 1 DIABETES MELLITUS WITHOUT COMPLICATION (H): ICD-10-CM

## 2024-10-29 ENCOUNTER — VIRTUAL VISIT (OUTPATIENT)
Dept: EDUCATION SERVICES | Facility: CLINIC | Age: 29
End: 2024-10-29
Payer: COMMERCIAL

## 2024-10-29 DIAGNOSIS — E10.9 TYPE 1 DIABETES MELLITUS WITHOUT COMPLICATION (H): ICD-10-CM

## 2024-10-29 PROCEDURE — G0108 DIAB MANAGE TRN  PER INDIV: HCPCS | Mod: 95 | Performed by: NUTRITIONIST

## 2024-10-29 RX ORDER — INSULIN PMP CART,AUT,G6/7,CNTR
1 EACH SUBCUTANEOUS EVERY OTHER DAY
Qty: 15 EACH | Refills: 5 | Status: SHIPPED | OUTPATIENT
Start: 2024-10-29

## 2024-10-29 RX ORDER — INSULIN PMP CART,AUT,G6/7,CNTR
1 EACH SUBCUTANEOUS ONCE
Qty: 1 KIT | Refills: 0 | Status: SHIPPED | OUTPATIENT
Start: 2024-10-29 | End: 2024-10-29

## 2024-10-29 RX ORDER — ACYCLOVIR 400 MG/1
TABLET ORAL
Qty: 3 EACH | Refills: 5 | Status: SHIPPED | OUTPATIENT
Start: 2024-10-29

## 2024-10-29 NOTE — PATIENT INSTRUCTIONS
Santhosh Zendejas,    Nice talking with you today!    An order has been placed for Dexcom G7 sensors, omnipod 5 (G6/G7 compatible) Kit, Omnipod 5 (G6/G7 compatible) Pods.    Please keep track of your meal time insulin doses this week and send to me via [employer]. Please put all data into one message once you are done tracking the doses.     You are scheduled for the pump start (tentatively) December 6th from 9-11am. Just a heads up that this is a group class so it will be important to arrive on time. If you decide you would prefer a one on one that is also an option!    Thank you,  Estephanie       Below is some additional important info we spoke about. Please read through everything --------      Emergency issues: Here are some concerns you should contact us about.  -Vomiting: more than twice.  Please check ketones.  If positive, go to ER. Monitor glucose hourly.   -High glucose (over 300 mg/dL twice in a row): Please check ketones.  If ketones are negative, take an insulin correction and recheck glucose in 1 hour.  If glucose is not coming down, please call the clinic. If ketones are moderate or large, drink lots of water, take an insulin correction 1.5 times your usual correction, and recheck ketones in 1 hour.  If ketones are still present (or you are vomiting), go to the ER.  -High glucose (over 300 mg/dL twice in a row) with a pump:  Twice in doubt, take it out.  Change your pump site. Check ketones.  If ketones are negative, take an insulin correction by syringe/pen and recheck glucose in 1 hour.  If glucose is not coming down, please call the clinic. If ketones are moderate or large, drink lots of water, take an insulin correction 1.5 times your usual correction by syringe/pen, and recheck ketones in 1 hour.  If ketones are still present (or you are vomiting), go to the ER.  -Hypoglycemia (low glucose):   If glucose is less than 70 mg/dL, treat with 15g carb (4 glucose tablets), recheck glucose in 15 minutes.  If low again,  repeat.   If glucose is less than 54 mg/dL, treat with 30g carb, recheck glucose in 15 minutes.  If low again, repeat.  Keep glucagon in your home in case of severe hypoglycemia and train someone how to use this.    Emergency kit (please ensure you always have these with you):   Glucose tablets  Glucagon  Insulin  Syringes/needles   Extra Pods  Ketone strips    Backup insulin plan (in case of pump failure):   If your insulin pump fails, your body will not have any insulin available and your blood glucose levels can get dangerously high. This can result in diabetic ketoacidosis making you very sick (abdominal pain, confusion, vomiting, dehydration, positive urine ketones).    You have an active long acting basal insulin (Degludec: Tresiba / Detemir: Levemir / Glargine: Lantus / Toujeo / Semglee / Basaglar) prescription available. Please pick this up from your pharmacy in case your pump fails.  Basal insulin dose:______TBD______ units once per day.    Immediately after your pump fails and until you can  the long acting insulin, use short acting insulin (Aspart: Novolog/Fiasp / Lispro: Humalog / Glulisine:Apidra) injections (pen or vial and syringe) per your correction scale every 4 hours and continue to cover carbohydrates. You can stop this 4 hourly correction after you give yourself the basal insulin dose.    You should also administer short acting insulin subcutaneously to cover carbohydrates and per your correction scale prior to meals.     Contact us for help transitioning back to your pump when this is available.    Contact information:   If you have concerns, please send me a [employer] message or call the clinic at [phone].  For more urgent concerns, please call [phone] after hours/weekends and ask to speak with the endocrinologist on call.      Please let me know if you are having low blood sugars less than 70 or over 350 mg/dL.  Do not wait until your next appointment if this is happening.       -------      WHAT TO DO IF YOU ARE EXPERIENCING HIGH GLUCOSE WHEN WEARING AN INSULIN PUMP  THINGS TO CARRY WITH YOU  Extra insertion set or insulin pod.   Insulin syringe  Meter and Strips  Fast acting form of carbohydrate for treating lows.    ACTION PLAN FOR HANDLING AN UNEXPLAINED HIGH GLUCOSE ON INSULIN PUMP:  The following instructions should be used any time you have unexplained high blood sugar. Proper handling of high blood sugars can prevent diabetic ketoacidosis and a trip to the emergency room.  If your BG is > 250 twice in a row, troubleshoot your pump, infusion set and site.   Check your site and pump.   Is the set connected to your body? Did the cannula get dislodged or kinked?  Is there a leak at the insertion site or from the tubing or the pump?  Is there discomfort, redness or bleeding at the site, or has set been in > 3 days?  Check the insulin pump  Check bolus history, did you forget the last bolus?  Is your pump suspended?    Alarm history: Any recent alarms?  Empty reservoir?   Check to make sure the date and time are correct  Check the insulin  Is it ? How long has it been at room temperature?  Has it been left in a warm location?  Is the insulin in your reservoir older than 3 days?   What To Do:   If you find a logical cause for the high BG, take corrective action. A logical cause for the high BG might be that you omitted a meal bolus, you suspended your pump and forgot to resume it, or your infusion set became dislodged, your site is bad or your insulin has gone bad.   If you detect a problem with the insertion set or site,    Change the set/site  Then give yourself a correction bolus using your pump and re-check your BG in one hour.    It should be coming down.  If it is not coming down, call your endocrinologist for guidance.   If you can t find a logical cause for the high BG:     Give yourself a correction bolus with the pump and recheck your BG again in about an hour and a  half.    2. If BG the same or higher, give yourself a manual correction using an insulin syringe or pen.   3.  Assume that there is something wrong with the infusion set, and change out the infusion set.  At his point, also check your urine ketones to make sure you are not heading  Toward diabetic ketoacidosis (DKA).    If Ketones Negative to Small:  Recheck BG in 2 hours  If decreasing, monitor BG every 1-2 hours throughout the rest of the day.  Keep monitoring BG for the rest of the day to make sure BG continues to decrease.  If BG not decreasing, call your endocrinologist or go to the emergency department.   For Moderate or Large Urine Ketones:  Moderate or large amounts of urine ketones when you also have high blood sugar means that you need more insulin and fluid right away!  Call your endocrinologist or go to the emergency department.  Endocrinologist On-Call: [phone] (for weekends and after office hours)   Endocrinology Clinic: [phone] (weekday 8am to 4pm)       -------    HOW TO PREPARE FOR STARTING YOUR OMNIPOD 5 INSULIN PUMP:    The Website to access training materials is:   [email or web address].  The website for Omnipod has lots of helpful information that will assist you after you start your insulin pump, with inserting the pod, navigating the PDM, and things like securing the pod and where to place it.  Look under the Tab called 'Training Resources'.  It has some videos that explain how the algorithm in the pump works to adjust the basal rate, etc.  The more you can prepare prior to starting the pump, the smoother the transition will be.      Take the controller out of the box and feel free to explore the menus.    In order for your care team to access your pump data you need to follow the instructions attached to this email and set up both an Omnipod account and a [employer] account.  [employer] is  The  service that Omnipod uses.  In order to  share data with our clinic, you need to enter the Pro-Connect code:  [email or web address] where it asks for it.         Fully charge the PDM prior to your appointment.      Your pump  may be Noelle Mabry.  Noelle is an RN, Certified Diabetes Care and  (like me), who does pump training for Omnipod.  She should be in contact with you soon.

## 2024-10-29 NOTE — NURSING NOTE
Current patient location: [facility]    Is the patient currently in the state of [place]? YES    Visit mode:VIDEO    If the visit is dropped, the patient can be reconnected by: VIDEO VISIT: Text to cell phone:   Telephone Information:   Mobile [phone]       Will anyone else be joining the visit? NO  (If patient encounters technical issues they should call [phone]    Are changes needed to the allergy or medication list? No, Pt stated no changes to allergies, and Pt stated no med changes    Are refills needed on medications prescribed by this physician? NO    Rooming Documentation:  Not applicable    Reason for visit: Diabetes Education    Yuliana AARON

## 2024-10-29 NOTE — PROGRESS NOTES
Virtual Visit Details    Type of service:  Video Visit     Joined the call at 10/29/2024, 10:15:55 am  Left the call at 10/29/2024, 11:21:54 am  You were on the call for 1 hour 5 minutes 58 seconds    Originating Location (pt. Location): Home    Distant Location (provider location):  On-site  Platform used for Video Visit: Vargas    Diabetes Self-Management Education & Support    SUBJECTIVE:  Aashish Vanegas presents today for education related to planning for an insulin pump  Aashish is accompanied by significant other  Patient concerns: has no specific complaints and has been feeling well.  Progress toward previously established goals for this visit.    Socio/Economic History:  See previous visit    MONITORING:  BG meter: Dexcom CGM G6    Unable to view updated report as patient uses the G6 .   Last report in Sommer Dsouza's progress note from last in clinic visit    Will upgrade to G7 today. Patient feels confident self starting and will reach out with any questions.     RAPID-ACTING INSULIN CALCULATIONS:  Does patient currently count carbs? yes, counts in grams  Is instruction indicated? NO  How is insulin determined for correction: uses correction factor or follows sliding scale  Glargine 40 units   Novolog - 10-20 per meal 2-3 meals per day 5-15 units for snacks - patient will keep track of insulin doses for a week and send to me via [employer] so TDD can be more accurately determined    PROBLEM SOLVING:  Patient is at risk of hypoglycemia?: YES  Hospitalizations for hyper or hypoglycemia: Yes (Please explain): over two years ago with DKA. Hospitalized with DKA three times since diagnosis 19 years ago.   What is considered a high blood sugar? Over 180    How is high BG treated: takes correction   Does Patient test for ketones? Yes or No  At what level of blood glucose are ketones tested? over 400 or 300 and feeling sick   If ketones are present, what action is taken? More correction     BEING ACTIVE:   "  Walks to work on Saturdays   What accomodations are made for exercising - Nothing   Saturday Sunday Monday work days    Diabetes knowledge and skills assessment:   Patient is knowledgeable in diabetes management concepts related to: Healthy Eating, Being Active, Monitoring, Taking Medication, Problem Solving, Reducing Risks, Healthy Coping, and Insulin Pump Concepts Balancing glucose and insulin  Carbohydrate counting  Calculating boluses    Patient needs further education on the following diabetes management concepts: Insulin Pump Concepts Problem solving with insulin pump therapy (BG monitoring; hypoglycemia signs/symptoms, treatment (glucagon) and prevention; hyperglycemia signs/symptoms, treatment and prevention; ketones, DKA signs/symptoms and prevention)  Hands on practice with basic pump button use    Based on learning assessment above, most appropriate setting for further diabetes education would be: Individual setting.      Education Materials Provided:  Demonstrated the operation of the bolus calculator and discussed how basal delivery works.  Reviewed placement of infusion set or pods.    Discussed need to change every three days to prevent lipodystrophy and promote insulin absorption.  Discussed supply ordering.   Discussed how to handle both hypoglycemia and hyperglycemia, including \"twice in doubt, change it out.\"   Calculated pump rates based on current insulin use and/or body weight calculation.  Reviewed procedure for uploading pump/sensor and given resources for doing this remotely.  Assisted with accessing phone application for pump download  Reviewed the need for close follow up post-pump start.  Contact info for local pump company reps.  Discussed back-up plan for pump failure.  Discussed preparation for pump start and supplies to bring to pump start appointment.  Discussed when to stop basal insulin prior to pump start.    PLAN:  See Patient Instructions, AVS printed and provided to patient " today.    Follow-up:   Pump start appointment made. December 6 9-11am. Follow up December 13th.   Encounter Type: Individual     Any diabetes medication dose changes were made via the CDE Protocol Collaborative Practice Agreement with referring provider.  A copy of this encounter was provided to patient's referring provider.

## 2024-10-31 ENCOUNTER — TELEPHONE (OUTPATIENT)
Dept: ENDOCRINOLOGY | Facility: CLINIC | Age: 29
End: 2024-10-31
Payer: COMMERCIAL

## 2024-10-31 DIAGNOSIS — E10.9 TYPE 1 DIABETES MELLITUS WITHOUT COMPLICATION (H): ICD-10-CM

## 2024-10-31 RX ORDER — PROCHLORPERAZINE 25 MG/1
SUPPOSITORY RECTAL
Qty: 6 EACH | Refills: 4 | Status: SHIPPED | OUTPATIENT
Start: 2024-10-31

## 2024-10-31 NOTE — TELEPHONE ENCOUNTER
The dexcom sensors need to be sent to DME. Can you resend the rx for the sensors to:    [facility] - [place], [place] - [address]  [address] [address]  Phone: [phone]  Fax: [phone]

## 2024-11-04 ENCOUNTER — TELEPHONE (OUTPATIENT)
Dept: ENDOCRINOLOGY | Facility: CLINIC | Age: 29
End: 2024-11-04
Payer: COMMERCIAL

## 2024-11-04 NOTE — TELEPHONE ENCOUNTER
[facility] Call [facility]    Phone Message    May a detailed message be left on voicemail: yes     Reason for Call: Other: [facility] checking status on signed written order for the Dexcom transmitter and Sensors they faxed over on 10/30/2024 and 11/01/2024 that they have not received back yet. They are faxing again, please watch out for those forms   Reference number: [id number]  Fax number: [phone]

## 2024-11-06 ENCOUNTER — MEDICAL CORRESPONDENCE (OUTPATIENT)
Dept: HEALTH INFORMATION MANAGEMENT | Facility: CLINIC | Age: 29
End: 2024-11-06
Payer: COMMERCIAL

## 2024-11-07 NOTE — TELEPHONE ENCOUNTER
[facility] called again to follow up on the message below, requesting order be faxed back to them

## 2024-11-16 ENCOUNTER — MYC REFILL (OUTPATIENT)
Dept: ENDOCRINOLOGY | Facility: CLINIC | Age: 29
End: 2024-11-16
Payer: COMMERCIAL

## 2024-11-16 DIAGNOSIS — E10.9 TYPE 1 DIABETES MELLITUS WITHOUT COMPLICATION (H): ICD-10-CM

## 2024-11-18 RX ORDER — PEN NEEDLE, DIABETIC 32GX 5/32"
NEEDLE, DISPOSABLE MISCELLANEOUS 4 TIMES DAILY
Qty: 400 EACH | Refills: 3 | Status: SHIPPED | OUTPATIENT
Start: 2024-11-18

## 2024-11-19 ENCOUNTER — TELEPHONE (OUTPATIENT)
Dept: EDUCATION SERVICES | Facility: CLINIC | Age: 29
End: 2024-11-19
Payer: COMMERCIAL

## 2024-11-19 DIAGNOSIS — E10.9 TYPE 1 DIABETES MELLITUS WITHOUT COMPLICATION (H): Primary | ICD-10-CM

## 2024-11-19 RX ORDER — ACYCLOVIR 400 MG/1
TABLET ORAL
Qty: 3 EACH | Refills: 5 | Status: SHIPPED | OUTPATIENT
Start: 2024-11-19

## 2024-11-19 NOTE — TELEPHONE ENCOUNTER
Spoke with Aashish. He will be changing his lantus over to morning soon.   When he is ready to switch, recommended to take half of evening dose 20 units at night, then half dose 20 units in the morning and the following morning take 40 units which is usual dose. Keep a close eye on glucose when making this change and take correction if needed. This will help set him up for starting the Omnipod at 9am on 12/6.   Patient is also requesting G7 sensors be sent to [facility].

## 2024-11-21 DIAGNOSIS — E10.9 TYPE 1 DIABETES MELLITUS WITHOUT COMPLICATION (H): Primary | ICD-10-CM

## 2024-11-21 RX ORDER — INSULIN ASPART 100 [IU]/ML
INJECTION, SOLUTION INTRAVENOUS; SUBCUTANEOUS
Qty: 30 ML | Refills: 5 | Status: SHIPPED | OUTPATIENT
Start: 2024-11-21

## 2025-01-03 ENCOUNTER — MYC REFILL (OUTPATIENT)
Dept: ENDOCRINOLOGY | Facility: CLINIC | Age: 30
End: 2025-01-03
Payer: COMMERCIAL

## 2025-01-03 DIAGNOSIS — E10.9 TYPE 1 DIABETES MELLITUS WITHOUT COMPLICATION (H): ICD-10-CM

## 2025-01-05 RX ORDER — INSULIN GLARGINE-YFGN 100 [IU]/ML
40 INJECTION, SOLUTION SUBCUTANEOUS AT BEDTIME
Qty: 12 ML | Refills: 3 | Status: SHIPPED | OUTPATIENT
Start: 2025-01-05

## 2025-01-15 NOTE — PROGRESS NOTES
01/15/25 8:03 AM  PATIENT LAB/IMAGING STATUS : Orders completed / No further action needed   01/27/25 10:28 AM : Appointment reminder phone call made to patient. Pt verbalized understanding, and to bring glucometer, and Provided address

## 2025-01-19 ENCOUNTER — HEALTH MAINTENANCE LETTER (OUTPATIENT)
Age: 30
End: 2025-01-19

## 2025-01-28 ENCOUNTER — OFFICE VISIT (OUTPATIENT)
Dept: ENDOCRINOLOGY | Facility: CLINIC | Age: 30
End: 2025-01-28
Payer: COMMERCIAL

## 2025-01-28 VITALS
WEIGHT: 191 LBS | HEART RATE: 98 BPM | SYSTOLIC BLOOD PRESSURE: 133 MMHG | DIASTOLIC BLOOD PRESSURE: 90 MMHG | BODY MASS INDEX: 29.91 KG/M2 | OXYGEN SATURATION: 95 %

## 2025-01-28 DIAGNOSIS — E10.9 TYPE 1 DIABETES MELLITUS WITHOUT COMPLICATION (H): ICD-10-CM

## 2025-01-28 LAB
EST. AVERAGE GLUCOSE BLD GHB EST-MCNC: 243 MG/DL
HBA1C MFR BLD: 10.1 %

## 2025-01-28 PROCEDURE — 83036 HEMOGLOBIN GLYCOSYLATED A1C: CPT | Performed by: PATHOLOGY

## 2025-01-28 RX ORDER — PEN NEEDLE, DIABETIC 32GX 5/32"
NEEDLE, DISPOSABLE MISCELLANEOUS 4 TIMES DAILY
Qty: 400 EACH | Refills: 3 | Status: SHIPPED | OUTPATIENT
Start: 2025-01-28

## 2025-01-28 ASSESSMENT — PAIN SCALES - GENERAL: PAINLEVEL_OUTOF10: NO PAIN (0)

## 2025-01-28 NOTE — PROGRESS NOTES
HPI  Aashish Vanegas is a 29 year old male with type 1 diabetes mellitus.  Clinic visit for diabetes follow up today.  Last visit with me was in Sept 2024.  Pt moved from [place] to [place] in early Aug 2023.  Pt lives with his girlfriend and one other friend and is working at a [employer].  No children.  He was dx having type 1 DM at age 10.  His diabetes is complicated by diabetic retinopathy. No hx of nephropathy or neuropathy.  Pt has hx of ADHD, depression, admissions for DKA- last episode was Fall of 2022 stating he was no longer on his parents health insurance and was without insulin for a few days which caused DKA. He also has hx of low Vit B12 and low Vit D. He states he has never been tested for celiac.  For his diabetes, he is currently taking  Lantus 40 units subcutaneous each am.  Apparently his insurance will no longer cover Tresiba.  He also takes Novolog 1 unit/4 gms CHO with meals with correction 1 unit/40 for BG > 140.  He is interested in the OmniPod5 insulin pump and has met with Estephanie Powell RD/JOHNY.  Pt tells me OmniPod5 insulin pump has been denied.  I spoke with Estephanie Powell RD/JOHNY who will check into this issue and reach out to pt via [employer].  Pt's A1C is 10.1 % today.  I reviewed his DexcomG7 sensor download data.  DexcomG7 30 day average glucose in target only 31 % of the time without frequent hypoglycemia.  Blood sugars are high. Issues with his ADHD and some missed insulin doses.  Aashish's C-peptide was < 0.10 ng/mL in 3/2021 and MERRICK Ab + 3/2021.  On ROS today, needs treatment for his ADHD.  Provided patient with phone number for adult mental health.  Pt denies blurred vision, n/v, SOB at rest, cough or fever.  No chest pain, abd pain, diarrhea, dysuria or hematuria.  Aashish denies neuropathy sx or foot ulcers.  No hx of HTN.    Diabetes Care  Retinopathy: yes- seen in Aug 2022 with PDR left eye and NPDR right eye.  Reminded patient again to schedule annual diabetic eye exam.     Nephropathy: none. Urine microalbuminuria negative in June 2024.  Neuropathy: none.  Foot Exam: No ulcers and normal monofilamentous exam.  Taking aspirin: no.  Lipids:  in June 2024- pt NOT fasting.  Insulin: Basal and meal time insulin with correction.   Testing: DexcomG7 sensor.     ROS  See under HPI.    Allergies  Allergies   Allergen Reactions    Penicillins Rash    Pollen Extract Other (See Comments)     Rhinorrhea, itchy eyes, sneezing.       Medications  Current Outpatient Medications   Medication Sig Dispense Refill    BD PEN NEEDLE ARCHIE 2ND GEN 32G X 4 MM miscellaneous Inject subcutaneously 4 times daily. Use 4 pen needles daily for insulin administration. 400 each 3    blood glucose (NO BRAND SPECIFIED) test strip One Touch Ultra 2 Use to test blood sugar 2 times daily. 200 strip 1    Continuous Glucose Sensor (DEXCOM G7 SENSOR) MISC Change every 10 days. 3 each 5    Continuous Glucose Sensor (DEXCOM G7 SENSOR) MISC Change every 10 days. 3 each 5    Glucagon (BAQSIMI) 3 MG/DOSE nasal powder Spray 1 spray (3 mg) in nostril as needed in the event of unconscious hypoglycemia or hypoglycemic seizure. May repeat dose if no response after 15 minutes. 1 each 1    insulin aspart (NOVOLOG PEN) 100 UNIT/ML pen Inject 1 unit/4 gms CHO with meals,plus correction (1unit/40 for BG > 140 ). Pt uses 60 units daily. 60 mL 4    Insulin Glargine-yfgn 100 UNIT/ML SOPN Inject 40 Units subcutaneously at bedtime. Please do NOT fill today 9/24/2024. 12 mL 3    lisdexamfetamine (VYVANSE) 10 MG capsule Take 10 mg by mouth as needed.      Continuous Blood Gluc Transmit (DEXCOM G6 TRANSMITTER) MISC Change every 3 months. (Patient not taking: Reported on 1/28/2025) 1 each 4    Continuous Glucose Monitor Sup KIT 1 Act continuous (Patient not taking: Reported on 1/28/2025) 1 kit 3    Continuous Glucose Sensor (DEXCOM G6 SENSOR) MISC Change every 10 days. (Patient not taking: Reported on 1/28/2025) 6 each 4    insulin aspart  (NOVOLOG VIAL) 100 UNITS/ML vial Use up to 70 units daily in insulin pump. (Patient not taking: Reported on 1/28/2025) 30 mL 5    Insulin Disposable Pump (OMNIPOD 5 PODS, GEN 5,) MISC 1 each every other day. (Patient not taking: Reported on 1/28/2025) 45 each 4     Family History    Denies family hx of diabetes.    Social History  Smoke: none at this time.   ETOH: social. 1 - 2 drinks per week per patient.  Marital status: single.  He lives with his girlfriend and 1 other friend.  Children: none.  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    Past Medical History  Type 1 DM dx age 10  Diabetic retinopathy  Low vit D  Low Vit B12  ADHD  Depression  Seasonal allergies.    No surgeries.      Physical Exam  /90   Pulse 98   Wt 86.6 kg (191 lb)   SpO2 95%   BMI 29.91 kg/m       FEET: No ulcers and normal monofilamentous exam today.    RESULTS    A1C 7.3 % on 7/27/2023    TSH normal in July 2023    Creat 0.8 / GFR > 60 mL/min in 7/2023    C-peptide < 0.10 ng/mL and FARHANA Ab + in 3/2021.    AC 9.1 % 9/24/2024.        ASSESSMENT/PLAN:      TYPE 1 DIABETES MELLITUS: 29 year old male with uncontrolled type 1 DM dx at age 10 complicated by diabetic retinopathy. No hx of nephropathy or neuropathy. Past hx of admissions for DKA. Last DKA episode was in Fall 2022 due to lack of insulin/medical insurance.  Aashish needs treatment for his ADHD. Mental Health phone number provided to patient again today.   Estephanie Powell RD/CDE will check status of patient's OmniPod5 insulin pump and place an appeal letter if needed.  For now will continue current insulin doses.  Reminded patient to take insulin for all food intake and to take his insulin 10 - 15 minutes prior to eating.  His urine microalbuminuria has been negative with normal creat/GFR and no sx of diabetic neuropathy or foot ulcers.   /90 today. He is to check is BP at home.  RETINOPATHY: Reminded patient to have diabetic eye exam done.    MENTAL HEALTH: Again,  provided patient with mental health contact number.    HEALTH MAINTENANCE: Reminded patient to establish care with primary care provider for health maintenance.  FOLLOW UP: With me in 4 months.       Time spent reviewing chart and labs today= 5 minutes.  Time for clinic visit today = 20 minutes.  Time for documentation today = 10 minutes.    Total time for visit today = 35 minutes.    Sommer Dsouza PA-C

## 2025-01-28 NOTE — NURSING NOTE
"Chief Complaint   Patient presents with    Diabetes     Vital signs:      BP: 133/90 Pulse: 98     SpO2: 95 %       Weight: 86.6 kg (191 lb)  Estimated body mass index is 29.91 kg/m  as calculated from the following:    Height as of 2/29/24: 1.702 m (5' 7\").    Weight as of this encounter: 86.6 kg (191 lb).        "

## 2025-01-28 NOTE — LETTER
1/28/2025       RE: Aashish Vanegas  [address] [facility] [place] [address]     Dear Colleague,    Thank you for referring your patient, Aashish Vanegas, to the [facility] [place] at [facility]. Please see a copy of my visit note below.    01/15/25 8:03 AM  PATIENT LAB/IMAGING STATUS : Orders completed / No further action needed   01/27/25 10:28 AM : Appointment reminder phone call made to patient. Pt verbalized understanding, and to bring glucometer, and Provided address      HPI  Aashish Vanegas is a 29 year old male with type 1 diabetes mellitus.  Clinic visit for diabetes follow up today.  Last visit with me was in Sept 2024.  Pt moved from [place] to [place] in early Aug 2023.  Pt lives with his girlfriend and one other friend and is working at a [employer].  No children.  He was dx having type 1 DM at age 10.  His diabetes is complicated by diabetic retinopathy. No hx of nephropathy or neuropathy.  Pt has hx of ADHD, depression, admissions for DKA- last episode was Fall of 2022 stating he was no longer on his parents health insurance and was without insulin for a few days which caused DKA. He also has hx of low Vit B12 and low Vit D. He states he has never been tested for celiac.  For his diabetes, he is currently taking  Lantus 40 units subcutaneous each am.  Apparently his insurance will no longer cover Tresiba.  He also takes Novolog 1 unit/4 gms CHO with meals with correction 1 unit/40 for BG > 140.  He is interested in the OmniPod5 insulin pump and has met with Estephanie Powell RD/JOHNY.  Pt tells me OmniPod5 insulin pump has been denied.  I spoke with Estephanie Powell RD/JOHNY who will check into this issue and reach out to pt via [employer].  Pt's A1C is 10.1 % today.  I reviewed his DexcomG7 sensor download data.  DexcomG7 30 day average glucose in target only 31 % of the time without frequent hypoglycemia.  Blood sugars  are high. Issues with his ADHD and some missed insulin doses.  Aashish's C-peptide was < 0.10 ng/mL in 3/2021 and MERRICK Ab + 3/2021.  On ROS today, needs treatment for his ADHD.  Provided patient with phone number for adult mental health.  Pt denies blurred vision, n/v, SOB at rest, cough or fever.  No chest pain, abd pain, diarrhea, dysuria or hematuria.  Aashish denies neuropathy sx or foot ulcers.  No hx of HTN.    Diabetes Care  Retinopathy: yes- seen in Aug 2022 with PDR left eye and NPDR right eye.  Reminded patient again to schedule annual diabetic eye exam.    Nephropathy: none. Urine microalbuminuria negative in June 2024.  Neuropathy: none.  Foot Exam: No ulcers and normal monofilamentous exam.  Taking aspirin: no.  Lipids:  in June 2024- pt NOT fasting.  Insulin: Basal and meal time insulin with correction.   Testing: DexcomG7 sensor.     ROS  See under HPI.    Allergies  Allergies   Allergen Reactions     Penicillins Rash     Pollen Extract Other (See Comments)     Rhinorrhea, itchy eyes, sneezing.       Medications  Current Outpatient Medications   Medication Sig Dispense Refill     BD PEN NEEDLE ARCHIE 2ND GEN 32G X 4 MM miscellaneous Inject subcutaneously 4 times daily. Use 4 pen needles daily for insulin administration. 400 each 3     blood glucose (NO BRAND SPECIFIED) test strip One Touch Ultra 2 Use to test blood sugar 2 times daily. 200 strip 1     Continuous Glucose Sensor (DEXCOM G7 SENSOR) MISC Change every 10 days. 3 each 5     Continuous Glucose Sensor (DEXCOM G7 SENSOR) MISC Change every 10 days. 3 each 5     Glucagon (BAQSIMI) 3 MG/DOSE nasal powder Spray 1 spray (3 mg) in nostril as needed in the event of unconscious hypoglycemia or hypoglycemic seizure. May repeat dose if no response after 15 minutes. 1 each 1     insulin aspart (NOVOLOG PEN) 100 UNIT/ML pen Inject 1 unit/4 gms CHO with meals,plus correction (1unit/40 for BG > 140 ). Pt uses 60 units daily. 60 mL 4     Insulin  Glargine-yfgn 100 UNIT/ML SOPN Inject 40 Units subcutaneously at bedtime. Please do NOT fill today 9/24/2024. 12 mL 3     lisdexamfetamine (VYVANSE) 10 MG capsule Take 10 mg by mouth as needed.       Continuous Blood Gluc Transmit (DEXCOM G6 TRANSMITTER) MISC Change every 3 months. (Patient not taking: Reported on 1/28/2025) 1 each 4     Continuous Glucose Monitor Sup KIT 1 Act continuous (Patient not taking: Reported on 1/28/2025) 1 kit 3     Continuous Glucose Sensor (DEXCOM G6 SENSOR) MISC Change every 10 days. (Patient not taking: Reported on 1/28/2025) 6 each 4     insulin aspart (NOVOLOG VIAL) 100 UNITS/ML vial Use up to 70 units daily in insulin pump. (Patient not taking: Reported on 1/28/2025) 30 mL 5     Insulin Disposable Pump (OMNIPOD 5 PODS, GEN 5,) MISC 1 each every other day. (Patient not taking: Reported on 1/28/2025) 45 each 4     Family History    Denies family hx of diabetes.    Social History  Smoke: none at this time.   ETOH: social. 1 - 2 drinks per week per patient.  Marital status: single.  He lives with his girlfriend and 1 other friend.  Children: none.  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    Past Medical History  Type 1 DM dx age 10  Diabetic retinopathy  Low vit D  Low Vit B12  ADHD  Depression  Seasonal allergies.    No surgeries.      Physical Exam  /90   Pulse 98   Wt 86.6 kg (191 lb)   SpO2 95%   BMI 29.91 kg/m       FEET: No ulcers and normal monofilamentous exam today.    RESULTS    A1C 7.3 % on 7/27/2023    TSH normal in July 2023    Creat 0.8 / GFR > 60 mL/min in 7/2023    C-peptide < 0.10 ng/mL and FARHANA Ab + in 3/2021.    AC 9.1 % 9/24/2024.        ASSESSMENT/PLAN:      TYPE 1 DIABETES MELLITUS: 29 year old male with uncontrolled type 1 DM dx at age 10 complicated by diabetic retinopathy. No hx of nephropathy or neuropathy. Past hx of admissions for DKA. Last DKA episode was in Fall 2022 due to lack of insulin/medical insurance.  Aashish needs treatment for his  ADHD. Mental Health phone number provided to patient again today.   Estephanie Powell RD/CDE will check status of patient's OmniPod5 insulin pump and place an appeal letter if needed.  For now will continue current insulin doses.  Reminded patient to take insulin for all food intake and to take his insulin 10 - 15 minutes prior to eating.  His urine microalbuminuria has been negative with normal creat/GFR and no sx of diabetic neuropathy or foot ulcers.   /90 today. He is to check is BP at home.  RETINOPATHY: Reminded patient to have diabetic eye exam done.    MENTAL HEALTH: Again, provided patient with mental health contact number.    HEALTH MAINTENANCE: Reminded patient to establish care with primary care provider for health maintenance.  FOLLOW UP: With me in 4 months.       Time spent reviewing chart and labs today= 5 minutes.  Time for clinic visit today = 20 minutes.  Time for documentation today = 10 minutes.    Total time for visit today = 35 minutes.    Sommer Dsouza PA-C        Again, thank you for allowing me to participate in the care of your patient.      Sincerely,    Sommer Dsouza PA-C

## 2025-01-31 ENCOUNTER — MYC MEDICAL ADVICE (OUTPATIENT)
Dept: EDUCATION SERVICES | Facility: CLINIC | Age: 30
End: 2025-01-31
Payer: COMMERCIAL

## 2025-01-31 DIAGNOSIS — E10.9 TYPE 1 DIABETES MELLITUS WITHOUT COMPLICATION (H): Primary | ICD-10-CM

## 2025-02-23 ENCOUNTER — MYC REFILL (OUTPATIENT)
Dept: ENDOCRINOLOGY | Facility: CLINIC | Age: 30
End: 2025-02-23
Payer: COMMERCIAL

## 2025-02-23 DIAGNOSIS — E10.9 TYPE 1 DIABETES MELLITUS WITHOUT COMPLICATION (H): ICD-10-CM

## 2025-03-16 ENCOUNTER — MYC REFILL (OUTPATIENT)
Dept: ENDOCRINOLOGY | Facility: CLINIC | Age: 30
End: 2025-03-16
Payer: COMMERCIAL

## 2025-03-16 DIAGNOSIS — E10.9 TYPE 1 DIABETES MELLITUS WITHOUT COMPLICATION (H): ICD-10-CM

## 2025-03-17 RX ORDER — INSULIN GLARGINE-YFGN 100 [IU]/ML
40 INJECTION, SOLUTION SUBCUTANEOUS AT BEDTIME
Qty: 15 ML | Refills: 3 | Status: SHIPPED | OUTPATIENT
Start: 2025-03-17

## 2025-03-30 ENCOUNTER — MYC REFILL (OUTPATIENT)
Dept: ENDOCRINOLOGY | Facility: CLINIC | Age: 30
End: 2025-03-30
Payer: COMMERCIAL

## 2025-03-30 DIAGNOSIS — E10.9 TYPE 1 DIABETES MELLITUS WITHOUT COMPLICATION (H): ICD-10-CM

## 2025-04-08 ENCOUNTER — TELEPHONE (OUTPATIENT)
Dept: ENDOCRINOLOGY | Facility: CLINIC | Age: 30
End: 2025-04-08
Payer: COMMERCIAL

## 2025-04-08 NOTE — TELEPHONE ENCOUNTER
My chart message sent to patient.     Soni Graham), PharmD  Endocrine & Diabetes Medication Therapy Management Pharmacist   [address] [place] [id number]     Contact information:   To schedule a MTM appointment: [phone]  For questions or concerns, please send a AppThwackt message or call the clinic at [phone].    For more urgent concerns that do not require 911, please call [phone] after hours/weekends and ask to speak with the Endocrinologist on call.

## 2025-04-08 NOTE — TELEPHONE ENCOUNTER
MTM appointment no showed, we made one more attempt to reschedule.     Routing back to referring provider and MTM Pharmacist Team        Teresa Mcdermott  MTM

## 2025-04-24 ENCOUNTER — TELEPHONE (OUTPATIENT)
Dept: ENDOCRINOLOGY | Facility: CLINIC | Age: 30
End: 2025-04-24
Payer: COMMERCIAL

## 2025-04-24 NOTE — TELEPHONE ENCOUNTER
Sent Mychart (1st Attempt) for the patient to call back and schedule the following:    Appointment type: RETURN DIABETES  Provider: Sommer Dsouza PA-C  Return date: [place] 5/21/25  Specialty phone number: [phone]  Additional appointment(s) needed: N/A  Additonal Notes:No  mailbox full, [email or web address], Due to changes in the providers schedule appt on 5/21 needs to get rescheduled to first available slot or it can be scheduled with CDE, Estephanie Powell as a follow up.  Patient has seen Estephanie in the past.    Kvng Cruz on 4/24/2025 at 2:06 PM

## 2025-05-14 ENCOUNTER — MYC REFILL (OUTPATIENT)
Dept: ENDOCRINOLOGY | Facility: CLINIC | Age: 30
End: 2025-05-14
Payer: COMMERCIAL

## 2025-05-14 DIAGNOSIS — E10.9 TYPE 1 DIABETES MELLITUS WITHOUT COMPLICATION (H): ICD-10-CM

## 2025-05-14 RX ORDER — PEN NEEDLE, DIABETIC 32GX 5/32"
NEEDLE, DISPOSABLE MISCELLANEOUS 4 TIMES DAILY
Qty: 400 EACH | Refills: 3 | Status: SHIPPED | OUTPATIENT
Start: 2025-05-14

## 2025-05-18 ENCOUNTER — HEALTH MAINTENANCE LETTER (OUTPATIENT)
Age: 30
End: 2025-05-18

## 2025-06-01 ENCOUNTER — MYC REFILL (OUTPATIENT)
Dept: ENDOCRINOLOGY | Facility: CLINIC | Age: 30
End: 2025-06-01
Payer: COMMERCIAL

## 2025-06-01 DIAGNOSIS — E10.9 TYPE 1 DIABETES MELLITUS WITHOUT COMPLICATION (H): ICD-10-CM

## 2025-06-01 RX ORDER — INSULIN GLARGINE-YFGN 100 [IU]/ML
40 INJECTION, SOLUTION SUBCUTANEOUS AT BEDTIME
Qty: 15 ML | Refills: 3 | Status: SHIPPED | OUTPATIENT
Start: 2025-06-01

## 2025-08-19 ENCOUNTER — MYC MEDICAL ADVICE (OUTPATIENT)
Dept: FAMILY MEDICINE | Facility: CLINIC | Age: 30
End: 2025-08-19
Payer: COMMERCIAL

## 2025-08-27 DIAGNOSIS — E10.9 TYPE 1 DIABETES MELLITUS WITHOUT COMPLICATION (H): ICD-10-CM

## 2025-08-31 ENCOUNTER — HEALTH MAINTENANCE LETTER (OUTPATIENT)
Age: 30
End: 2025-08-31